# Patient Record
Sex: FEMALE | Race: WHITE | Employment: OTHER | URBAN - METROPOLITAN AREA
[De-identification: names, ages, dates, MRNs, and addresses within clinical notes are randomized per-mention and may not be internally consistent; named-entity substitution may affect disease eponyms.]

---

## 2022-02-24 ENCOUNTER — APPOINTMENT (OUTPATIENT)
Dept: GENERAL RADIOLOGY | Age: 87
End: 2022-02-24
Attending: SPECIALIST
Payer: MEDICARE

## 2022-02-24 ENCOUNTER — HOSPITAL ENCOUNTER (OUTPATIENT)
Dept: CARDIAC CATH/INVASIVE PROCEDURES | Age: 87
Setting detail: OBSERVATION
Discharge: HOME | End: 2022-02-25
Attending: SPECIALIST | Admitting: SPECIALIST
Payer: MEDICARE

## 2022-02-24 PROBLEM — I44.1 SECOND DEGREE AV BLOCK: Status: ACTIVE | Noted: 2022-02-24

## 2022-02-24 PROCEDURE — 6370000000 HC RX 637 (ALT 250 FOR IP): Performed by: SPECIALIST

## 2022-02-24 PROCEDURE — 2580000003 HC RX 258: Performed by: SPECIALIST

## 2022-02-24 PROCEDURE — 2500000003 HC RX 250 WO HCPCS

## 2022-02-24 PROCEDURE — C1894 INTRO/SHEATH, NON-LASER: HCPCS

## 2022-02-24 PROCEDURE — 33208 INSRT HEART PM ATRIAL & VENT: CPT

## 2022-02-24 PROCEDURE — G0378 HOSPITAL OBSERVATION PER HR: HCPCS

## 2022-02-24 PROCEDURE — 2580000003 HC RX 258

## 2022-02-24 PROCEDURE — C1898 LEAD, PMKR, OTHER THAN TRANS: HCPCS

## 2022-02-24 PROCEDURE — 6360000002 HC RX W HCPCS

## 2022-02-24 PROCEDURE — 2780000010 HC IMPLANT OTHER

## 2022-02-24 PROCEDURE — 71045 X-RAY EXAM CHEST 1 VIEW: CPT

## 2022-02-24 PROCEDURE — 6360000004 HC RX CONTRAST MEDICATION: Performed by: SPECIALIST

## 2022-02-24 PROCEDURE — 6360000002 HC RX W HCPCS: Performed by: SPECIALIST

## 2022-02-24 PROCEDURE — 2709999900 HC NON-CHARGEABLE SUPPLY

## 2022-02-24 PROCEDURE — C1785 PMKR, DUAL, RATE-RESP: HCPCS

## 2022-02-24 RX ORDER — HYDROCODONE BITARTRATE AND ACETAMINOPHEN 5; 325 MG/1; MG/1
1 TABLET ORAL EVERY 4 HOURS PRN
Status: DISCONTINUED | OUTPATIENT
Start: 2022-02-24 | End: 2022-02-25 | Stop reason: HOSPADM

## 2022-02-24 RX ORDER — CITALOPRAM 10 MG/1
10 TABLET ORAL DAILY
Status: DISCONTINUED | OUTPATIENT
Start: 2022-02-24 | End: 2022-02-25 | Stop reason: HOSPADM

## 2022-02-24 RX ORDER — CITALOPRAM 10 MG/1
10 TABLET ORAL DAILY
COMMUNITY

## 2022-02-24 RX ORDER — ATORVASTATIN CALCIUM 10 MG/1
10 TABLET, FILM COATED ORAL DAILY
Status: DISCONTINUED | OUTPATIENT
Start: 2022-02-24 | End: 2022-02-25 | Stop reason: HOSPADM

## 2022-02-24 RX ORDER — SODIUM CHLORIDE 450 MG/100ML
INJECTION, SOLUTION INTRAVENOUS CONTINUOUS
Status: DISCONTINUED | OUTPATIENT
Start: 2022-02-24 | End: 2022-02-24

## 2022-02-24 RX ORDER — ASPIRIN 81 MG/1
81 TABLET, CHEWABLE ORAL DAILY
Status: DISCONTINUED | OUTPATIENT
Start: 2022-02-24 | End: 2022-02-25 | Stop reason: HOSPADM

## 2022-02-24 RX ORDER — HYDROCODONE BITARTRATE AND ACETAMINOPHEN 5; 325 MG/1; MG/1
2 TABLET ORAL EVERY 4 HOURS PRN
Status: DISCONTINUED | OUTPATIENT
Start: 2022-02-24 | End: 2022-02-25 | Stop reason: HOSPADM

## 2022-02-24 RX ORDER — ATORVASTATIN CALCIUM 10 MG/1
10 TABLET, FILM COATED ORAL DAILY
COMMUNITY

## 2022-02-24 RX ORDER — ASPIRIN 81 MG/1
81 TABLET, CHEWABLE ORAL DAILY
COMMUNITY

## 2022-02-24 RX ADMIN — GENTAMICIN SULFATE: 40 INJECTION, SOLUTION INTRAMUSCULAR; INTRAVENOUS at 09:38

## 2022-02-24 RX ADMIN — IOPAMIDOL 30 ML: 612 INJECTION, SOLUTION INTRAVENOUS at 15:08

## 2022-02-24 RX ADMIN — VANCOMYCIN HYDROCHLORIDE 1000 MG: 1 INJECTION, POWDER, LYOPHILIZED, FOR SOLUTION INTRAVENOUS at 09:08

## 2022-02-24 RX ADMIN — HYDROCODONE BITARTRATE AND ACETAMINOPHEN 1 TABLET: 5; 325 TABLET ORAL at 21:05

## 2022-02-24 ASSESSMENT — PAIN SCALES - GENERAL
PAINLEVEL_OUTOF10: 0
PAINLEVEL_OUTOF10: 6

## 2022-02-24 NOTE — PROGRESS NOTES
Pt arrived to pre/post cath from cath lab. Aqualcel dressing to left chest intact no bleeding or hematoma noted. Sling to left arm. Family at bedside.

## 2022-02-24 NOTE — OP NOTE
[address]                      [address], [address] [facility]                                OPERATIVE REPORT    PATIENT NAME: Linda Henriquez                    :        1935  MED REC NO:   [id number]                            ROOM:  ACCOUNT NO:   [de-identified]                           ADMIT DATE: 2022  PROVIDER:     Shannan Kumar MD    DATE OF PROCEDURE:  2022    PREOPERATIVE DIAGNOSIS:  Symptomatic second-degree AV block with  evidence also of sick sinus syndrome. POSTOPERATIVE DIAGNOSIS:  Documented high-grade AV block during the  implant of the pacemaker. PROCEDURE PERFORMED:  Permanent pacemaker implant. SURGEON:  Shannan Kumar MD    DESCRIPTION OF PROCEDURE:  The patient was brought to the EP lab in the  postabsorptive state. Vitals were stable. Informed consent was  obtained. The left hemithorax was prepared and draped in the usual  manner. The left infraclavicular fossa was infiltrated with 2%  Xylocaine. Incision was made two fingerbreadths below the left clavicle  and deepened down to the pectoralis fascia. Pocket was made by  dissection. Hemostasis was secured. Using the Seldinger technique  initially in a caudal view of 30 degrees attempting to get hold of the  left cephalic axillary venous system failed. Venography was done. We  ended up targeting the left subclavian vein, that was done successfully  two separate times. Two separate guidewires were advanced under direct  fluoroscopy to the junction of the superior vena cava and right atrium. Two number 7-Bulgarian dilators and inducers were advanced over one of the  guidewires at a time. The dilators and the guidewires were removed. A  ventricular lead by [employer], model number [id number], serial number  [id number] was advanced under direct fluoroscopy to RV septal position. This is the best possible position. I intentionally to avoided RV  apical positions. R-wave of 4.1 mV, slew rate of 1.8 volts per second,  impedance of 665 ohms. At 0.4 milliseconds, voltage threshold was 0.7  volts and the current of 1.4 mA. 10-volt testing was done. No  diaphragmatic pacing was documented. The lead was secured to the  pectoralis fascia muscle using two separate 0-silk sutures. Similar technique was used to manipulate the right atrial lead by  [employer], model number [id number], serial number of [id number]. The lead  was advanced under direct fluoroscopy to the right atrial appendage. P-wave of 2.3 mV, slew rate of 0.6 volts per second, impedance 571 ohms. At 0.4 milliseconds, voltage threshold was 0.6 volts and the current of  1.3 mA. 10-volt testing was done. No diaphragmatic pacing was  documented. Pacing at 100 beats per minute in the atrium documented to have 2:1 AV  block and sometimes 3:1 and 4:1 AV rj relation. The pocket was  irrigated with copious amount of antibiotic. Hemostasis was secured. A  pacemaker by Medtronic, model number [id number] serial number [id number] was  hooked up to the leads. Proper sensing and pacing was documented. A  TYRX pouch by Medtronic was used. The subcutaneous tissue was closed  using 3-0 Vicryl in two separate layers. The subcuticular tissue was  closed using 4-0 Vicryl. The wound was bandaged in the usual fashion. The patient tolerated the procedure well and was discharged from the EP  lab in stable condition.         Zoltan Segura MD    D: 02/24/2022 11:57:14       T: 02/24/2022 12:00:44     [email or web address]  Job#: [id number]     Doc#: [id number]    CC:

## 2022-02-24 NOTE — PROGRESS NOTES
Monitor attached and transport notified. Patient transferred to one [facility] by cart.   Left subclavian no bleeding or hematoma

## 2022-02-25 ENCOUNTER — APPOINTMENT (OUTPATIENT)
Dept: GENERAL RADIOLOGY | Age: 87
End: 2022-02-25
Attending: SPECIALIST
Payer: MEDICARE

## 2022-02-25 VITALS
HEART RATE: 51 BPM | RESPIRATION RATE: 18 BRPM | DIASTOLIC BLOOD PRESSURE: 57 MMHG | WEIGHT: 134 LBS | BODY MASS INDEX: 22.88 KG/M2 | HEIGHT: 64 IN | SYSTOLIC BLOOD PRESSURE: 109 MMHG | TEMPERATURE: 97.4 F | OXYGEN SATURATION: 95 %

## 2022-02-25 PROCEDURE — G0378 HOSPITAL OBSERVATION PER HR: HCPCS

## 2022-02-25 PROCEDURE — 6360000002 HC RX W HCPCS: Performed by: SPECIALIST

## 2022-02-25 PROCEDURE — 6370000000 HC RX 637 (ALT 250 FOR IP): Performed by: SPECIALIST

## 2022-02-25 PROCEDURE — 2580000003 HC RX 258: Performed by: SPECIALIST

## 2022-02-25 PROCEDURE — 71046 X-RAY EXAM CHEST 2 VIEWS: CPT

## 2022-02-25 RX ADMIN — VANCOMYCIN HYDROCHLORIDE 1000 MG: 1 INJECTION, POWDER, LYOPHILIZED, FOR SOLUTION INTRAVENOUS at 10:30

## 2022-02-25 RX ADMIN — ATORVASTATIN CALCIUM 10 MG: 10 TABLET, FILM COATED ORAL at 07:44

## 2022-02-25 RX ADMIN — ASPIRIN 81 MG 81 MG: 81 TABLET ORAL at 07:44

## 2022-02-25 RX ADMIN — CITALOPRAM HYDROBROMIDE 10 MG: 10 TABLET ORAL at 07:44

## 2022-02-25 ASSESSMENT — PAIN SCALES - GENERAL
PAINLEVEL_OUTOF10: 0

## 2022-02-25 NOTE — CARE COORDINATION
[facility] Case Management Initial Discharge Assessment    Met with Patient to discuss discharge plan. PCP: No primary care provider on file. DR. Chandrakant Álvarez                 Date of Last Visit: LAST MONTH     [facility] Patient: No        [facility] Notified: no    If no PCP, list provided? N/A    Discharge Planning    Living Arrangements: independently at home    Who do you live with? ALONE   Who helps you with your care:  [facility]     If lives at home:     Do you have any barriers navigating in your home? yes - 1 STEP    Patient can perform ADL? Yes    Current Services (outpatient and in home) :  None    Dialysis: No    Is transportation available to get to your appointments? Yes    DME Equipment:  no    Respiratory equipment: None    Respiratory provider:  no     Pharmacy:  [facility]     Consult with Medication Assistance Program?  No      Patient agreeable to [facility]? Declined    Patient agreeable to SNF/Rehab? Declined    Other discharge needs identified? N/A    Does Patient Have a High-Risk for Readmission Diagnosis (CHF, PN, MI, COPD)? No      Initial Discharge Plan? DISCUSSED W PT NEEDS AT DISCHARGE. PT DENIES NEEDS AT THIS TIME. CM TO FOLLOW.  (Note: please see concurrent daily documentation for any updates after initial note).       Readmission Risk              Risk of Unplanned Readmission:  0         Electronically signed by Ki Li RN on 2/25/2022 at 1:42 PM

## 2022-02-25 NOTE — DISCHARGE INSTR - ACTIVITY
NO HEAVY LIFTING OR DO NOT RAISE LEFT ARM ABOVE HEART LEVEL FOR 4-6 WEEKS. WEAR ARM SLING AS NEEDED FOR REMINDER. WATCH LEFT DRESSING FOR INCREASED SWELLING- HARDNESS- EXCESSIVE BRUISING WITH SWELLING. IF SO CALL DR. JAIN.

## 2022-02-25 NOTE — DISCHARGE INSTR - DIET
Good nutrition is important when healing from an illness, injury, or surgery. Follow any nutrition recommendations given to you during your hospital stay. If you were given an oral nutrition supplement while in the hospital, continue to take this supplement at home. You can take it with meals, in-between meals, and/or before bedtime. These supplements can be purchased at most local grocery stores, pharmacies, and chain Outline-stores. If you have any questions about your diet or nutrition, call the hospital and ask for the dietitian. LOW SALT LOW FAT DIET.

## 2023-09-20 ENCOUNTER — HOSPITAL ENCOUNTER (OUTPATIENT)
Dept: DATA CONVERSION | Facility: HOSPITAL | Age: 88
End: 2023-09-20
Attending: INTERNAL MEDICINE | Admitting: INTERNAL MEDICINE

## 2023-09-20 DIAGNOSIS — Z86.73 PERSONAL HISTORY OF TRANSIENT ISCHEMIC ATTACK (TIA), AND CEREBRAL INFARCTION WITHOUT RESIDUAL DEFICITS: ICD-10-CM

## 2023-09-20 DIAGNOSIS — Z95.0 PRESENCE OF CARDIAC PACEMAKER: ICD-10-CM

## 2023-09-20 DIAGNOSIS — Z87.891 PERSONAL HISTORY OF NICOTINE DEPENDENCE: ICD-10-CM

## 2023-09-20 DIAGNOSIS — Z45.09 ENCOUNTER FOR ADJUSTMENT AND MANAGEMENT OF OTHER CARDIAC DEVICE: ICD-10-CM

## 2023-09-20 DIAGNOSIS — I48.0 PAROXYSMAL ATRIAL FIBRILLATION (MULTI): ICD-10-CM

## 2023-09-20 DIAGNOSIS — Z79.01 LONG TERM (CURRENT) USE OF ANTICOAGULANTS: ICD-10-CM

## 2023-11-09 DIAGNOSIS — Z95.0 PACEMAKER: Primary | ICD-10-CM

## 2023-11-09 DIAGNOSIS — I48.0 PAROXYSMAL ATRIAL FIBRILLATION (MULTI): ICD-10-CM

## 2024-01-09 ENCOUNTER — TELEPHONE (OUTPATIENT)
Dept: CARDIOLOGY | Facility: CLINIC | Age: 89
End: 2024-01-09
Payer: MEDICARE

## 2024-01-09 NOTE — TELEPHONE ENCOUNTER
Patient says the doctor who used to refill her Sotalol 80mg is no longer her provider but she needs a refill before she sees you on 2/13/24. Would it be possible to refill that RX to the [facility] in [place]?

## 2024-01-22 NOTE — TELEPHONE ENCOUNTER
Patient stopped in the office about IC Sotalol 80 mg again. She only has a few day's worth of medication left. Pharmacy [facility] in [place] [phone]    She was an [facility] patient, and she says Dr. Henriquez took her loop recorder out. But she still have pace maker. She has an appointment to see Dr. Henriquez on 2/13/24

## 2024-01-23 DIAGNOSIS — I48.0 PAROXYSMAL ATRIAL FIBRILLATION (MULTI): Primary | ICD-10-CM

## 2024-01-23 RX ORDER — SOTALOL HYDROCHLORIDE 80 MG/1
40 TABLET ORAL DAILY
Qty: 45 TABLET | Refills: 3 | Status: SHIPPED | OUTPATIENT
Start: 2024-01-23 | End: 2024-01-25 | Stop reason: SDUPTHER

## 2024-01-23 NOTE — Clinical Note
PT needs sotalol re-sent to [facility]: pharmacy updated in chart now: (was previously sent to [facility])  please sign

## 2024-01-24 NOTE — TELEPHONE ENCOUNTER
IC Sotalol 80mg sent to wrong pharmacy; please re-send to:      [id number]  [address] [place] [address]  Phone: [phone]   Fax: [phone]

## 2024-01-25 DIAGNOSIS — I48.0 PAROXYSMAL ATRIAL FIBRILLATION (MULTI): ICD-10-CM

## 2024-01-25 RX ORDER — SOTALOL HYDROCHLORIDE 80 MG/1
40 TABLET ORAL DAILY
Qty: 45 TABLET | Refills: 3 | Status: CANCELLED | OUTPATIENT
Start: 2024-01-25 | End: 2025-01-24

## 2024-01-25 RX ORDER — SOTALOL HYDROCHLORIDE 80 MG/1
40 TABLET ORAL DAILY
Qty: 45 TABLET | Refills: 3 | Status: SHIPPED | OUTPATIENT
Start: 2024-01-25 | End: 2025-01-24

## 2024-01-25 NOTE — PROGRESS NOTES
IC Sotalol 80mg sent to wrong pharmacy; please re-send to:       [id number]  [address] [place] [address]  Phone: [phone]   Fax: [phone]      sent to provider for re-send with updated pharmacy

## 2024-02-13 ENCOUNTER — ANCILLARY PROCEDURE (OUTPATIENT)
Dept: CARDIOLOGY | Facility: CLINIC | Age: 89
End: 2024-02-13
Payer: MEDICARE

## 2024-02-13 ENCOUNTER — OFFICE VISIT (OUTPATIENT)
Dept: CARDIOLOGY | Facility: CLINIC | Age: 89
End: 2024-02-13
Payer: MEDICARE

## 2024-02-13 VITALS
HEIGHT: 63 IN | HEART RATE: 89 BPM | TEMPERATURE: 96.8 F | BODY MASS INDEX: 23.85 KG/M2 | SYSTOLIC BLOOD PRESSURE: 100 MMHG | DIASTOLIC BLOOD PRESSURE: 80 MMHG | WEIGHT: 134.6 LBS

## 2024-02-13 DIAGNOSIS — I48.0 PAROXYSMAL ATRIAL FIBRILLATION (MULTI): ICD-10-CM

## 2024-02-13 DIAGNOSIS — Z95.0 PACEMAKER: ICD-10-CM

## 2024-02-13 DIAGNOSIS — I48.0 PAROXYSMAL ATRIAL FIBRILLATION (MULTI): Primary | ICD-10-CM

## 2024-02-13 DIAGNOSIS — Z95.0 CARDIAC PACEMAKER IN SITU: ICD-10-CM

## 2024-02-13 PROCEDURE — 99213 OFFICE O/P EST LOW 20 MIN: CPT | Performed by: INTERNAL MEDICINE

## 2024-02-13 PROCEDURE — 93280 PM DEVICE PROGR EVAL DUAL: CPT | Performed by: INTERNAL MEDICINE

## 2024-02-13 PROCEDURE — 93290 INTERROG DEV EVAL ICPMS IP: CPT | Performed by: INTERNAL MEDICINE

## 2024-02-13 PROCEDURE — 1036F TOBACCO NON-USER: CPT | Performed by: INTERNAL MEDICINE

## 2024-02-13 PROCEDURE — 1159F MED LIST DOCD IN RCRD: CPT | Performed by: INTERNAL MEDICINE

## 2024-02-13 RX ORDER — ATORVASTATIN CALCIUM 10 MG/1
10 TABLET, FILM COATED ORAL DAILY
COMMUNITY

## 2024-02-13 RX ORDER — CHOLECALCIFEROL (VITAMIN D3) 50 MCG
2000 TABLET ORAL DAILY
COMMUNITY

## 2024-02-13 RX ORDER — RIVAROXABAN 15 MG/1
15 TABLET, FILM COATED ORAL DAILY
Qty: 90 TABLET | Refills: 3 | Status: SHIPPED | OUTPATIENT
Start: 2024-02-13

## 2024-02-13 RX ORDER — RIVAROXABAN 15 MG/1
15 TABLET, FILM COATED ORAL DAILY
COMMUNITY
End: 2024-02-13 | Stop reason: SDUPTHER

## 2024-02-13 RX ORDER — NAPROXEN SODIUM 220 MG/1
81 TABLET, FILM COATED ORAL DAILY
COMMUNITY
End: 2024-02-13 | Stop reason: WASHOUT

## 2024-02-13 RX ORDER — CITALOPRAM 10 MG/1
10 TABLET ORAL DAILY
COMMUNITY

## 2024-02-13 ASSESSMENT — PATIENT HEALTH QUESTIONNAIRE - PHQ9
SUM OF ALL RESPONSES TO PHQ9 QUESTIONS 1 AND 2: 0
1. LITTLE INTEREST OR PLEASURE IN DOING THINGS: NOT AT ALL
2. FEELING DOWN, DEPRESSED OR HOPELESS: NOT AT ALL

## 2024-02-13 NOTE — PROGRESS NOTES
Subjective:  The patient is an 88-year-old white female, followed primarily by Dr. Minnie Arrington, who presents for pacemaker follow-up.  She has lived in [place], [place] (near [place]) for over 50 years, but moved to [place] a few years ago after her  , as her daughter lives in the same condominium complex that she does.  While in [place], the patient underwent a pulmonary vein isolation because of paroxysmal atrial fibrillation, done in .  The patient suffered a small stroke in 2021, with expressive aphasia that resolved.  She underwent insertable loop recorder placement by Dr. Venkatesh Jorge, looking for paroxysmal atrial fibrillation, although this ultimately showed intermittent high-grade AV block.  In 2022, a Medtronic DDDR pacemaker was placed but the loop recorder was not removed.  The atrial fibrillation burden increased by both devices, and in 2022, the patient was hospitalized for initiation of sotalol, handling a dose of just 40 mg daily.  She has been anticoagulated with Xarelto 15 mg daily.  I elected to remove her loop recorder, done in 2023.    The patient is currently doing well.  She attends [employer] and tries to read through the entire Bible each year.  She is currently in the Old Testament book of Numbers.    The patient's daughter is an  and works long hours.  Her daughter is not .  The patient does not dine with her daughter much, but cooks TV dinners instead.    Current Outpatient Medications   Medication Sig    atorvastatin (Lipitor) 10 mg tablet Take 1 tablet (10 mg) by mouth once daily.    cholecalciferol (Vitamin D-3) 50 MCG (2000 UT) tablet Take 1 tablet (2,000 Units) by mouth once daily.    citalopram (CeleXA) 10 mg tablet Take 1 tablet (10 mg) by mouth once daily.    sotalol (Betapace) 80 mg tablet Take 0.5 tablets (40 mg) by mouth once daily.    Xarelto 15 mg tablet Take 1 tablet (15 mg) by  "mouth once daily.     Allergies:  Patient has no known allergies.     Past Surgical History:   Procedure Laterality Date    MR HEAD ANGIO WO IV CONTRAST  3/30/2021    MR HEAD ANGIO WO IV CONTRAST 3/30/2021 [facility] CLINICAL [facility]GAC[facility]    MR NECK ANGIO WO IV CONTRAST  3/30/2021    MR NECK ANGIO WO IV CONTRAST 3/30/2021 [facility] CLINICAL LEGACY     Objective:  Vitals:    02/13/24 1524   BP: 100/80   Pulse: 89   Temp: 36 °C (96.8 °F)   Height:     1.6 m (5' 3\")  Weight: 61.1 kg (134 lb 9.6 oz)     Exam:  Gen: Delightful elderly woman in no distress.  HEENT: No scleral icterus.  Neck: No jugular venous distention or thyromegaly.  Left subclavian pacemaker pocket: Normal in appearance.  Lungs: Clear to auscultation, with no wheezes or rales.  Heart: Regular rhythm with grade 1/6 systolic ejection murmur and preserved S2.  Abdomen: Benign, with no organomegaly or masses.  Extremities: Intact distal pulses; no edema.  Neuro: No focal neurologic abnormalities.  Skin: No cutaneous lesions.    Device Check:  A Medtronic pacemaker check was done and demonstrated normal device function.  The unit is programmed DDDR between 60 bpm and 120 bpm, which provides 58% atrial pacing and 99.8% ventricular pacing due to AV block.  The lead parameters were good.  The atrial fibrillation burden was less than 0.1%.  The remaining device longevity is 9.3 years.    Impressions:  1.  Paroxysmal atrial fibrillation, status post PVI in 2016 but with later recurrences.  She is now on sotalol at 40 mg daily and appears to be maintaining sinus rhythm well.  She has a [id number] score of at least 5 (female gender, 2 points for age over 75, 2 points for stroke), and is appropriately anticoagulated with Xarelto.  2.  Intermittent AV block, status post Medtronic DDDR pacemaker placement in February 2022, with normal device function.  3.  Status post Medtronic insertable loop recorder placement in April 2021, removed by me in September 2023.  4.  Status post " small stroke in March 2021, with no long-term neurologic deficits.  5.  Anxiety and depression, on Celexa therapy.  6.  Hyperlipidemia, on statin therapy.    Recommendations:  1.  The patient's device will be followed remotely every 3 months.  2.  Will see me in the office on an annual basis for pacemaker checks.  3.  With any significant increase in her atrial fibrillation burden, she may be considered for low-dose amiodarone to replace her sotalol.      Glen Henriquez MD

## 2024-05-21 ENCOUNTER — HOSPITAL ENCOUNTER (OUTPATIENT)
Dept: CARDIOLOGY | Facility: HOSPITAL | Age: 89
Discharge: HOME | End: 2024-05-21
Payer: MEDICARE

## 2024-05-21 DIAGNOSIS — Z95.0 PACEMAKER: ICD-10-CM

## 2024-05-21 DIAGNOSIS — I48.0 PAROXYSMAL ATRIAL FIBRILLATION (MULTI): ICD-10-CM

## 2024-05-21 PROCEDURE — 93296 REM INTERROG EVL PM/IDS: CPT

## 2024-05-21 PROCEDURE — 93294 REM INTERROG EVL PM/LDLS PM: CPT | Performed by: INTERNAL MEDICINE

## 2024-08-27 ENCOUNTER — HOSPITAL ENCOUNTER (OUTPATIENT)
Dept: CARDIOLOGY | Facility: HOSPITAL | Age: 89
Discharge: HOME | End: 2024-08-27
Payer: MEDICARE

## 2024-08-27 DIAGNOSIS — Z95.0 PACEMAKER: ICD-10-CM

## 2024-08-27 DIAGNOSIS — I48.0 PAROXYSMAL ATRIAL FIBRILLATION (MULTI): ICD-10-CM

## 2024-08-27 PROCEDURE — 93294 REM INTERROG EVL PM/LDLS PM: CPT | Performed by: INTERNAL MEDICINE

## 2024-08-27 PROCEDURE — 93296 REM INTERROG EVL PM/IDS: CPT

## 2024-09-03 ENCOUNTER — TELEPHONE (OUTPATIENT)
Dept: PRIMARY CARE | Facility: CLINIC | Age: 89
End: 2024-09-03

## 2024-09-03 NOTE — TELEPHONE ENCOUNTER
Patient left voicemail stating that she is to have oral surgery.  Would like to know when to stop and start blood thinner for procedure.  Please advise.

## 2024-10-05 ENCOUNTER — APPOINTMENT (OUTPATIENT)
Dept: RADIOLOGY | Facility: HOSPITAL | Age: 89
DRG: 871 | End: 2024-10-05
Payer: MEDICARE

## 2024-10-05 ENCOUNTER — HOSPITAL ENCOUNTER (INPATIENT)
Facility: HOSPITAL | Age: 89
LOS: 3 days | Discharge: HOME | DRG: 871 | End: 2024-10-08
Attending: EMERGENCY MEDICINE | Admitting: INTERNAL MEDICINE
Payer: MEDICARE

## 2024-10-05 ENCOUNTER — APPOINTMENT (OUTPATIENT)
Dept: CARDIOLOGY | Facility: HOSPITAL | Age: 89
DRG: 871 | End: 2024-10-05
Payer: MEDICARE

## 2024-10-05 DIAGNOSIS — K52.9 COLITIS: ICD-10-CM

## 2024-10-05 DIAGNOSIS — I95.9 HYPOTENSION, UNSPECIFIED HYPOTENSION TYPE: ICD-10-CM

## 2024-10-05 DIAGNOSIS — F33.40 RECURRENT MAJOR DEPRESSION IN REMISSION (CMS-HCC): ICD-10-CM

## 2024-10-05 DIAGNOSIS — J18.9 PNEUMONIA DUE TO INFECTIOUS ORGANISM, UNSPECIFIED LATERALITY, UNSPECIFIED PART OF LUNG: Primary | ICD-10-CM

## 2024-10-05 DIAGNOSIS — R11.2 NAUSEA AND VOMITING, UNSPECIFIED VOMITING TYPE: ICD-10-CM

## 2024-10-05 DIAGNOSIS — J18.9 PNEUMONIA OF LEFT LOWER LOBE DUE TO INFECTIOUS ORGANISM: ICD-10-CM

## 2024-10-05 PROBLEM — A41.9 SEPSIS (MULTI): Status: ACTIVE | Noted: 2024-10-05

## 2024-10-05 LAB
ALBUMIN SERPL BCP-MCNC: 4.1 G/DL (ref 3.4–5)
ALP SERPL-CCNC: 69 U/L (ref 33–136)
ALT SERPL W P-5'-P-CCNC: 31 U/L (ref 7–45)
ANION GAP SERPL CALC-SCNC: 16 MMOL/L (ref 10–20)
APPEARANCE UR: CLEAR
APTT PPP: 30 SECONDS (ref 27–38)
AST SERPL W P-5'-P-CCNC: 36 U/L (ref 9–39)
BASOPHILS # BLD AUTO: 0.01 X10*3/UL (ref 0–0.1)
BASOPHILS NFR BLD AUTO: 0.2 %
BILIRUB SERPL-MCNC: 0.8 MG/DL (ref 0–1.2)
BILIRUB UR STRIP.AUTO-MCNC: NEGATIVE MG/DL
BUN SERPL-MCNC: 18 MG/DL (ref 6–23)
CALCIUM SERPL-MCNC: 9.4 MG/DL (ref 8.6–10.3)
CARDIAC TROPONIN I PNL SERPL HS: 10 NG/L (ref 0–13)
CARDIAC TROPONIN I PNL SERPL HS: 17 NG/L (ref 0–13)
CHLORIDE SERPL-SCNC: 100 MMOL/L (ref 98–107)
CO2 SERPL-SCNC: 28 MMOL/L (ref 21–32)
COLOR UR: NORMAL
CREAT SERPL-MCNC: 0.98 MG/DL (ref 0.5–1.05)
EGFRCR SERPLBLD CKD-EPI 2021: 56 ML/MIN/1.73M*2
EOSINOPHIL # BLD AUTO: 0.07 X10*3/UL (ref 0–0.4)
EOSINOPHIL NFR BLD AUTO: 1.7 %
ERYTHROCYTE [DISTWIDTH] IN BLOOD BY AUTOMATED COUNT: 12.8 % (ref 11.5–14.5)
FLUAV RNA RESP QL NAA+PROBE: NOT DETECTED
FLUBV RNA RESP QL NAA+PROBE: NOT DETECTED
GLUCOSE SERPL-MCNC: 104 MG/DL (ref 74–99)
GLUCOSE UR STRIP.AUTO-MCNC: NORMAL MG/DL
HCT VFR BLD AUTO: 34.8 % (ref 36–46)
HGB BLD-MCNC: 11.3 G/DL (ref 12–16)
IMM GRANULOCYTES # BLD AUTO: 0.06 X10*3/UL (ref 0–0.5)
IMM GRANULOCYTES NFR BLD AUTO: 1.5 % (ref 0–0.9)
INR PPP: 1.5 (ref 0.9–1.1)
KETONES UR STRIP.AUTO-MCNC: NEGATIVE MG/DL
LACTATE SERPL-SCNC: 1.7 MMOL/L (ref 0.4–2)
LEUKOCYTE ESTERASE UR QL STRIP.AUTO: NEGATIVE
LYMPHOCYTES # BLD AUTO: 0.43 X10*3/UL (ref 0.8–3)
LYMPHOCYTES NFR BLD AUTO: 10.4 %
MAGNESIUM SERPL-MCNC: 1.58 MG/DL (ref 1.6–2.4)
MCH RBC QN AUTO: 30.5 PG (ref 26–34)
MCHC RBC AUTO-ENTMCNC: 32.5 G/DL (ref 32–36)
MCV RBC AUTO: 94 FL (ref 80–100)
MONOCYTES # BLD AUTO: 0.09 X10*3/UL (ref 0.05–0.8)
MONOCYTES NFR BLD AUTO: 2.2 %
MRSA DNA SPEC QL NAA+PROBE: NOT DETECTED
NEUTROPHILS # BLD AUTO: 3.46 X10*3/UL (ref 1.6–5.5)
NEUTROPHILS NFR BLD AUTO: 84 %
NITRITE UR QL STRIP.AUTO: NEGATIVE
NRBC BLD-RTO: 0 /100 WBCS (ref 0–0)
PH UR STRIP.AUTO: 5.5 [PH]
PLATELET # BLD AUTO: 94 X10*3/UL (ref 150–450)
POTASSIUM SERPL-SCNC: 4.1 MMOL/L (ref 3.5–5.3)
PROT SERPL-MCNC: 7.3 G/DL (ref 6.4–8.2)
PROT UR STRIP.AUTO-MCNC: NEGATIVE MG/DL
PROTHROMBIN TIME: 16.6 SECONDS (ref 9.8–12.8)
RBC # BLD AUTO: 3.71 X10*6/UL (ref 4–5.2)
RBC # UR STRIP.AUTO: NEGATIVE /UL
RBC MORPH BLD: NORMAL
SARS-COV-2 RNA RESP QL NAA+PROBE: NOT DETECTED
SODIUM SERPL-SCNC: 140 MMOL/L (ref 136–145)
SP GR UR STRIP.AUTO: 1.01
UROBILINOGEN UR STRIP.AUTO-MCNC: NORMAL MG/DL
WBC # BLD AUTO: 4.1 X10*3/UL (ref 4.4–11.3)

## 2024-10-05 PROCEDURE — 81003 URINALYSIS AUTO W/O SCOPE: CPT

## 2024-10-05 PROCEDURE — 84484 ASSAY OF TROPONIN QUANT: CPT

## 2024-10-05 PROCEDURE — 85610 PROTHROMBIN TIME: CPT

## 2024-10-05 PROCEDURE — 71275 CT ANGIOGRAPHY CHEST: CPT | Mod: FOREIGN READ | Performed by: RADIOLOGY

## 2024-10-05 PROCEDURE — 70450 CT HEAD/BRAIN W/O DYE: CPT

## 2024-10-05 PROCEDURE — 96375 TX/PRO/DX INJ NEW DRUG ADDON: CPT

## 2024-10-05 PROCEDURE — 83605 ASSAY OF LACTIC ACID: CPT

## 2024-10-05 PROCEDURE — 2500000004 HC RX 250 GENERAL PHARMACY W/ HCPCS (ALT 636 FOR OP/ED)

## 2024-10-05 PROCEDURE — 87636 SARSCOV2 & INF A&B AMP PRB: CPT

## 2024-10-05 PROCEDURE — 71045 X-RAY EXAM CHEST 1 VIEW: CPT

## 2024-10-05 PROCEDURE — 72125 CT NECK SPINE W/O DYE: CPT

## 2024-10-05 PROCEDURE — 99291 CRITICAL CARE FIRST HOUR: CPT | Mod: 25 | Performed by: EMERGENCY MEDICINE

## 2024-10-05 PROCEDURE — 74177 CT ABD & PELVIS W/CONTRAST: CPT | Mod: FOREIGN READ | Performed by: RADIOLOGY

## 2024-10-05 PROCEDURE — 2020000001 HC ICU ROOM DAILY

## 2024-10-05 PROCEDURE — 96365 THER/PROPH/DIAG IV INF INIT: CPT

## 2024-10-05 PROCEDURE — 87040 BLOOD CULTURE FOR BACTERIA: CPT | Mod: STJLAB

## 2024-10-05 PROCEDURE — 83735 ASSAY OF MAGNESIUM: CPT

## 2024-10-05 PROCEDURE — 93005 ELECTROCARDIOGRAM TRACING: CPT

## 2024-10-05 PROCEDURE — 80053 COMPREHEN METABOLIC PANEL: CPT

## 2024-10-05 PROCEDURE — 36415 COLL VENOUS BLD VENIPUNCTURE: CPT

## 2024-10-05 PROCEDURE — 99291 CRITICAL CARE FIRST HOUR: CPT | Performed by: EMERGENCY MEDICINE

## 2024-10-05 PROCEDURE — 70450 CT HEAD/BRAIN W/O DYE: CPT | Performed by: RADIOLOGY

## 2024-10-05 PROCEDURE — 84145 PROCALCITONIN (PCT): CPT | Mod: STJLAB

## 2024-10-05 PROCEDURE — 96367 TX/PROPH/DG ADDL SEQ IV INF: CPT

## 2024-10-05 PROCEDURE — 2500000005 HC RX 250 GENERAL PHARMACY W/O HCPCS

## 2024-10-05 PROCEDURE — 2550000001 HC RX 255 CONTRASTS: Performed by: EMERGENCY MEDICINE

## 2024-10-05 PROCEDURE — 71045 X-RAY EXAM CHEST 1 VIEW: CPT | Mod: FOREIGN READ | Performed by: RADIOLOGY

## 2024-10-05 PROCEDURE — 74177 CT ABD & PELVIS W/CONTRAST: CPT

## 2024-10-05 PROCEDURE — 87641 MR-STAPH DNA AMP PROBE: CPT

## 2024-10-05 PROCEDURE — 85025 COMPLETE CBC W/AUTO DIFF WBC: CPT

## 2024-10-05 PROCEDURE — 71275 CT ANGIOGRAPHY CHEST: CPT

## 2024-10-05 PROCEDURE — 2500000001 HC RX 250 WO HCPCS SELF ADMINISTERED DRUGS (ALT 637 FOR MEDICARE OP)

## 2024-10-05 PROCEDURE — 87449 NOS EACH ORGANISM AG IA: CPT | Mod: STJLAB

## 2024-10-05 PROCEDURE — 87899 AGENT NOS ASSAY W/OPTIC: CPT | Mod: STJLAB

## 2024-10-05 PROCEDURE — 72125 CT NECK SPINE W/O DYE: CPT | Performed by: RADIOLOGY

## 2024-10-05 RX ORDER — ATORVASTATIN CALCIUM 10 MG/1
10 TABLET, FILM COATED ORAL DAILY
Status: DISCONTINUED | OUTPATIENT
Start: 2024-10-06 | End: 2024-10-06

## 2024-10-05 RX ORDER — MAGNESIUM SULFATE HEPTAHYDRATE 40 MG/ML
2 INJECTION, SOLUTION INTRAVENOUS EVERY 6 HOURS PRN
Status: DISCONTINUED | OUTPATIENT
Start: 2024-10-05 | End: 2024-10-07

## 2024-10-05 RX ORDER — ACETAMINOPHEN 325 MG/1
650 TABLET ORAL EVERY 6 HOURS PRN
Status: DISCONTINUED | OUTPATIENT
Start: 2024-10-05 | End: 2024-10-08 | Stop reason: HOSPADM

## 2024-10-05 RX ORDER — POLYETHYLENE GLYCOL 3350 17 G/17G
17 POWDER, FOR SOLUTION ORAL DAILY
Status: DISCONTINUED | OUTPATIENT
Start: 2024-10-05 | End: 2024-10-08 | Stop reason: HOSPADM

## 2024-10-05 RX ORDER — CALCIUM GLUCONATE 20 MG/ML
2 INJECTION, SOLUTION INTRAVENOUS EVERY 6 HOURS PRN
Status: DISCONTINUED | OUTPATIENT
Start: 2024-10-05 | End: 2024-10-07

## 2024-10-05 RX ORDER — MAGNESIUM SULFATE HEPTAHYDRATE 40 MG/ML
2 INJECTION, SOLUTION INTRAVENOUS ONCE
Status: COMPLETED | OUTPATIENT
Start: 2024-10-05 | End: 2024-10-05

## 2024-10-05 RX ORDER — KETOROLAC TROMETHAMINE 15 MG/ML
15 INJECTION, SOLUTION INTRAMUSCULAR; INTRAVENOUS ONCE
Status: COMPLETED | OUTPATIENT
Start: 2024-10-05 | End: 2024-10-05

## 2024-10-05 RX ORDER — CITALOPRAM 20 MG/1
10 TABLET, FILM COATED ORAL DAILY
Status: DISCONTINUED | OUTPATIENT
Start: 2024-10-06 | End: 2024-10-07 | Stop reason: ALTCHOICE

## 2024-10-05 RX ORDER — MAGNESIUM SULFATE HEPTAHYDRATE 40 MG/ML
4 INJECTION, SOLUTION INTRAVENOUS EVERY 6 HOURS PRN
Status: DISCONTINUED | OUTPATIENT
Start: 2024-10-05 | End: 2024-10-07

## 2024-10-05 RX ORDER — POTASSIUM CHLORIDE 1.5 G/1.58G
20 POWDER, FOR SOLUTION ORAL EVERY 6 HOURS PRN
Status: DISCONTINUED | OUTPATIENT
Start: 2024-10-05 | End: 2024-10-08

## 2024-10-05 RX ORDER — POTASSIUM CHLORIDE 20 MEQ/1
20 TABLET, EXTENDED RELEASE ORAL EVERY 6 HOURS PRN
Status: DISCONTINUED | OUTPATIENT
Start: 2024-10-05 | End: 2024-10-08

## 2024-10-05 RX ORDER — CALCIUM GLUCONATE 20 MG/ML
1 INJECTION, SOLUTION INTRAVENOUS EVERY 6 HOURS PRN
Status: DISCONTINUED | OUTPATIENT
Start: 2024-10-05 | End: 2024-10-07

## 2024-10-05 RX ORDER — POTASSIUM CHLORIDE 1.5 G/1.58G
40 POWDER, FOR SOLUTION ORAL EVERY 6 HOURS PRN
Status: DISCONTINUED | OUTPATIENT
Start: 2024-10-05 | End: 2024-10-08

## 2024-10-05 RX ORDER — PANTOPRAZOLE SODIUM 40 MG/1
40 TABLET, DELAYED RELEASE ORAL
Status: DISCONTINUED | OUTPATIENT
Start: 2024-10-06 | End: 2024-10-08 | Stop reason: HOSPADM

## 2024-10-05 RX ORDER — ONDANSETRON HYDROCHLORIDE 2 MG/ML
4 INJECTION, SOLUTION INTRAVENOUS ONCE
Status: COMPLETED | OUTPATIENT
Start: 2024-10-05 | End: 2024-10-05

## 2024-10-05 RX ORDER — SOTALOL HYDROCHLORIDE 80 MG/1
40 TABLET ORAL DAILY
Status: DISCONTINUED | OUTPATIENT
Start: 2024-10-06 | End: 2024-10-08 | Stop reason: HOSPADM

## 2024-10-05 RX ORDER — CHOLECALCIFEROL (VITAMIN D3) 25 MCG
2000 TABLET ORAL DAILY
Status: DISCONTINUED | OUTPATIENT
Start: 2024-10-06 | End: 2024-10-08 | Stop reason: HOSPADM

## 2024-10-05 RX ORDER — ONDANSETRON HYDROCHLORIDE 2 MG/ML
4 INJECTION, SOLUTION INTRAVENOUS EVERY 8 HOURS PRN
Status: DISCONTINUED | OUTPATIENT
Start: 2024-10-05 | End: 2024-10-08 | Stop reason: HOSPADM

## 2024-10-05 RX ORDER — ACETAMINOPHEN 325 MG/1
975 TABLET ORAL ONCE
Status: COMPLETED | OUTPATIENT
Start: 2024-10-05 | End: 2024-10-05

## 2024-10-05 RX ORDER — LANOLIN ALCOHOL/MO/W.PET/CERES
400 CREAM (GRAM) TOPICAL DAILY
Status: DISCONTINUED | OUTPATIENT
Start: 2024-10-05 | End: 2024-10-08 | Stop reason: HOSPADM

## 2024-10-05 RX ORDER — POTASSIUM CHLORIDE 20 MEQ/1
40 TABLET, EXTENDED RELEASE ORAL EVERY 6 HOURS PRN
Status: DISCONTINUED | OUTPATIENT
Start: 2024-10-05 | End: 2024-10-08

## 2024-10-05 SDOH — ECONOMIC STABILITY: INCOME INSECURITY: IN THE LAST 12 MONTHS, WAS THERE A TIME WHEN YOU WERE NOT ABLE TO PAY THE MORTGAGE OR RENT ON TIME?: NO

## 2024-10-05 SDOH — SOCIAL STABILITY: SOCIAL INSECURITY: WITHIN THE LAST YEAR, HAVE YOU BEEN AFRAID OF YOUR PARTNER OR EX-PARTNER?: NO

## 2024-10-05 SDOH — ECONOMIC STABILITY: TRANSPORTATION INSECURITY: IN THE PAST 12 MONTHS, HAS LACK OF TRANSPORTATION KEPT YOU FROM MEDICAL APPOINTMENTS OR FROM GETTING MEDICATIONS?: NO

## 2024-10-05 SDOH — ECONOMIC STABILITY: FOOD INSECURITY: WITHIN THE PAST 12 MONTHS, THE FOOD YOU BOUGHT JUST DIDN'T LAST AND YOU DIDN'T HAVE MONEY TO GET MORE.: NEVER TRUE

## 2024-10-05 SDOH — SOCIAL STABILITY: SOCIAL INSECURITY: WITHIN THE LAST YEAR, HAVE YOU BEEN HUMILIATED OR EMOTIONALLY ABUSED IN OTHER WAYS BY YOUR PARTNER OR EX-PARTNER?: NO

## 2024-10-05 SDOH — ECONOMIC STABILITY: HOUSING INSECURITY: IN THE PAST 12 MONTHS, HOW MANY TIMES HAVE YOU MOVED WHERE YOU WERE LIVING?: 0

## 2024-10-05 SDOH — SOCIAL STABILITY: SOCIAL INSECURITY
WITHIN THE LAST YEAR, HAVE TO BEEN RAPED OR FORCED TO HAVE ANY KIND OF SEXUAL ACTIVITY BY YOUR PARTNER OR EX-PARTNER?: NO

## 2024-10-05 SDOH — SOCIAL STABILITY: SOCIAL INSECURITY
WITHIN THE LAST YEAR, HAVE YOU BEEN RAPED OR FORCED TO HAVE ANY KIND OF SEXUAL ACTIVITY BY YOUR PARTNER OR EX-PARTNER?: NO

## 2024-10-05 SDOH — ECONOMIC STABILITY: FOOD INSECURITY: WITHIN THE PAST 12 MONTHS, YOU WORRIED THAT YOUR FOOD WOULD RUN OUT BEFORE YOU GOT THE MONEY TO BUY MORE.: NEVER TRUE

## 2024-10-05 SDOH — ECONOMIC STABILITY: HOUSING INSECURITY: AT ANY TIME IN THE PAST 12 MONTHS, WERE YOU HOMELESS OR LIVING IN A SHELTER (INCLUDING NOW)?: NO

## 2024-10-05 SDOH — SOCIAL STABILITY: SOCIAL INSECURITY: HAVE YOU HAD THOUGHTS OF HARMING ANYONE ELSE?: NO

## 2024-10-05 SDOH — SOCIAL STABILITY: SOCIAL INSECURITY: ARE THERE ANY APPARENT SIGNS OF INJURIES/BEHAVIORS THAT COULD BE RELATED TO ABUSE/NEGLECT?: NO

## 2024-10-05 SDOH — SOCIAL STABILITY: SOCIAL INSECURITY: HAS ANYONE EVER THREATENED TO HURT YOUR FAMILY OR YOUR PETS?: NO

## 2024-10-05 SDOH — ECONOMIC STABILITY: TRANSPORTATION INSECURITY
IN THE PAST 12 MONTHS, HAS LACK OF TRANSPORTATION KEPT YOU FROM MEETINGS, WORK, OR FROM GETTING THINGS NEEDED FOR DAILY LIVING?: NO

## 2024-10-05 SDOH — SOCIAL STABILITY: SOCIAL INSECURITY: DO YOU FEEL ANYONE HAS EXPLOITED OR TAKEN ADVANTAGE OF YOU FINANCIALLY OR OF YOUR PERSONAL PROPERTY?: NO

## 2024-10-05 SDOH — ECONOMIC STABILITY: FOOD INSECURITY: WITHIN THE PAST 12 MONTHS, YOU WORRIED THAT YOUR FOOD WOULD RUN OUT BEFORE YOU GOT MONEY TO BUY MORE.: NEVER TRUE

## 2024-10-05 SDOH — SOCIAL STABILITY: SOCIAL INSECURITY: DOES ANYONE TRY TO KEEP YOU FROM HAVING/CONTACTING OTHER FRIENDS OR DOING THINGS OUTSIDE YOUR HOME?: NO

## 2024-10-05 SDOH — SOCIAL STABILITY: SOCIAL INSECURITY
WITHIN THE LAST YEAR, HAVE YOU BEEN KICKED, HIT, SLAPPED, OR OTHERWISE PHYSICALLY HURT BY YOUR PARTNER OR EX-PARTNER?: NO

## 2024-10-05 SDOH — ECONOMIC STABILITY: INCOME INSECURITY: IN THE PAST 12 MONTHS HAS THE ELECTRIC, GAS, OIL, OR WATER COMPANY THREATENED TO SHUT OFF SERVICES IN YOUR HOME?: NO

## 2024-10-05 SDOH — ECONOMIC STABILITY: TRANSPORTATION INSECURITY
IN THE PAST 12 MONTHS, HAS THE LACK OF TRANSPORTATION KEPT YOU FROM MEDICAL APPOINTMENTS OR FROM GETTING MEDICATIONS?: NO

## 2024-10-05 SDOH — SOCIAL STABILITY: SOCIAL INSECURITY: ABUSE: ADULT

## 2024-10-05 SDOH — ECONOMIC STABILITY: INCOME INSECURITY: IN THE PAST 12 MONTHS, HAS THE ELECTRIC, GAS, OIL, OR WATER COMPANY THREATENED TO SHUT OFF SERVICE IN YOUR HOME?: NO

## 2024-10-05 SDOH — ECONOMIC STABILITY: HOUSING INSECURITY: IN THE LAST 12 MONTHS, WAS THERE A TIME WHEN YOU WERE NOT ABLE TO PAY THE MORTGAGE OR RENT ON TIME?: NO

## 2024-10-05 SDOH — ECONOMIC STABILITY: INCOME INSECURITY: HOW HARD IS IT FOR YOU TO PAY FOR THE VERY BASICS LIKE FOOD, HOUSING, MEDICAL CARE, AND HEATING?: NOT HARD AT ALL

## 2024-10-05 SDOH — SOCIAL STABILITY: SOCIAL INSECURITY: ARE YOU OR HAVE YOU BEEN THREATENED OR ABUSED PHYSICALLY, EMOTIONALLY, OR SEXUALLY BY ANYONE?: NO

## 2024-10-05 SDOH — ECONOMIC STABILITY: FOOD INSECURITY: HOW HARD IS IT FOR YOU TO PAY FOR THE VERY BASICS LIKE FOOD, HOUSING, MEDICAL CARE, AND HEATING?: NOT HARD AT ALL

## 2024-10-05 SDOH — SOCIAL STABILITY: SOCIAL INSECURITY: DO YOU FEEL UNSAFE GOING BACK TO THE PLACE WHERE YOU ARE LIVING?: NO

## 2024-10-05 SDOH — SOCIAL STABILITY: SOCIAL INSECURITY: HAVE YOU HAD ANY THOUGHTS OF HARMING ANYONE ELSE?: NO

## 2024-10-05 ASSESSMENT — PAIN SCALES - GENERAL
PAINLEVEL_OUTOF10: 0 - NO PAIN

## 2024-10-05 ASSESSMENT — COGNITIVE AND FUNCTIONAL STATUS - GENERAL
DAILY ACTIVITIY SCORE: 20
TOILETING: A LITTLE
HELP NEEDED FOR BATHING: A LITTLE
DRESSING REGULAR LOWER BODY CLOTHING: A LITTLE
CLIMB 3 TO 5 STEPS WITH RAILING: A LITTLE
DRESSING REGULAR UPPER BODY CLOTHING: A LITTLE
MOBILITY SCORE: 20
STANDING UP FROM CHAIR USING ARMS: A LITTLE
PATIENT BASELINE BEDBOUND: NO
MOVING TO AND FROM BED TO CHAIR: A LITTLE
WALKING IN HOSPITAL ROOM: A LITTLE

## 2024-10-05 ASSESSMENT — PATIENT HEALTH QUESTIONNAIRE - PHQ9
SUM OF ALL RESPONSES TO PHQ9 QUESTIONS 1 & 2: 0
1. LITTLE INTEREST OR PLEASURE IN DOING THINGS: NOT AT ALL
2. FEELING DOWN, DEPRESSED OR HOPELESS: NOT AT ALL

## 2024-10-05 ASSESSMENT — LIFESTYLE VARIABLES
HOW OFTEN DO YOU HAVE 6 OR MORE DRINKS ON ONE OCCASION: NEVER
AUDIT-C TOTAL SCORE: 1
HOW MANY STANDARD DRINKS CONTAINING ALCOHOL DO YOU HAVE ON A TYPICAL DAY: 1 OR 2
AUDIT-C TOTAL SCORE: 1
SKIP TO QUESTIONS 9-10: 1
HOW OFTEN DO YOU HAVE A DRINK CONTAINING ALCOHOL: MONTHLY OR LESS

## 2024-10-05 ASSESSMENT — ACTIVITIES OF DAILY LIVING (ADL)
DRESSING YOURSELF: INDEPENDENT
HEARING - RIGHT EAR: HEARING AID
JUDGMENT_ADEQUATE_SAFELY_COMPLETE_DAILY_ACTIVITIES: YES
PATIENT'S MEMORY ADEQUATE TO SAFELY COMPLETE DAILY ACTIVITIES?: YES
HEARING - LEFT EAR: HEARING AID
TOILETING: INDEPENDENT
FEEDING YOURSELF: INDEPENDENT
GROOMING: INDEPENDENT
LACK_OF_TRANSPORTATION: NO
ADEQUATE_TO_COMPLETE_ADL: YES
WALKS IN HOME: INDEPENDENT
ASSISTIVE_DEVICE: CANE;EYEGLASSES
LACK_OF_TRANSPORTATION: NO
BATHING: INDEPENDENT

## 2024-10-05 ASSESSMENT — COLUMBIA-SUICIDE SEVERITY RATING SCALE - C-SSRS
1. IN THE PAST MONTH, HAVE YOU WISHED YOU WERE DEAD OR WISHED YOU COULD GO TO SLEEP AND NOT WAKE UP?: NO
6. HAVE YOU EVER DONE ANYTHING, STARTED TO DO ANYTHING, OR PREPARED TO DO ANYTHING TO END YOUR LIFE?: NO
2. HAVE YOU ACTUALLY HAD ANY THOUGHTS OF KILLING YOURSELF?: NO

## 2024-10-05 ASSESSMENT — PAIN - FUNCTIONAL ASSESSMENT
PAIN_FUNCTIONAL_ASSESSMENT: 0-10
PAIN_FUNCTIONAL_ASSESSMENT: 0-10

## 2024-10-05 NOTE — ED PROVIDER NOTES
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  Hyperlipidemia as well as asthma presenting today with a chief complaint of nausea vomiting and fluid symptoms.  She states that she has been feeling sick for the past few days.  She is brought in via EMS.  They reported that she had onset of vomiting around 130, the patient confirms this.  Prior to this she was feeling nauseous and states that her symptoms were building up.  She also endorses some dizziness, states that feels the room is spinning.  This dizziness is almost resolved at this time.  Denies any obvious chest pain.  She does have a mild amount of shortness of breath.  She does not endorse abdominal discomfort as well.        Nursing Notes were reviewed.    PAST MEDICAL HISTORY   History reviewed. No pertinent past medical history.      SURGICAL HISTORY       Past Surgical History:   Procedure Laterality Date   • MR HEAD ANGIO WO IV CONTRAST  3/30/2021    MR HEAD ANGIO WO IV CONTRAST 3/30/2021 [facility]ICAL [facility]GAC[facility]   • MR NECK ANGIO WO IV CONTRAST  3/30/2021    MR NECK ANGIO WO IV CONTRAST 3/30/2021 [facility] CLINICAL LEGACY         CURRENT MEDICATIONS       Current Discharge Medication List        CONTINUE these medications which have NOT CHANGED    Details   atorvastatin (Lipitor) 10 mg tablet Take 1 tablet (10 mg) by mouth once daily.      cholecalciferol (Vitamin D-3) 50 MCG (2000 UT) tablet Take 1 tablet (2,000 Units) by mouth once daily.      citalopram (CeleXA) 10 mg tablet Take 1 tablet (10  mg) by mouth once daily.      sotalol (Betapace) 80 mg tablet Take 0.5 tablets (40 mg) by mouth once daily.  Qty: 45 tablet, Refills: 3    Associated Diagnoses: Paroxysmal atrial fibrillation (Multi)      Xarelto 15 mg tablet Take 1 tablet (15 mg) by mouth once daily.  Qty: 90 tablet, Refills: 3    Associated Diagnoses: Paroxysmal atrial fibrillation (Multi)             ALLERGIES     Patient has no known allergies.    FAMILY HISTORY     No family history on file.       SOCIAL HISTORY       Social History     Socioeconomic History   • Marital status:    Tobacco Use   • Smoking status: Never   • Smokeless tobacco: Never   Substance and Sexual Activity   • Alcohol use: Not Currently   • Drug use: Not Currently     Social Determinants of Health     Financial Resource Strain: Low Risk  (10/5/2024)    Overall Financial Resource Strain (CARDIA)    • Difficulty of Paying Living Expenses: Not hard at all   Food Insecurity: No Food Insecurity (10/5/2024)    Hunger Vital Sign    • Worried About Running Out of Food in the Last Year: Never true    • Ran Out of Food in the Last Year: Never true   Transportation Needs: No Transportation Needs (10/5/2024)    PRAPARE - Transportation    • Lack of Transportation (Medical): No    • Lack of Transportation (Non-Medical): No   Intimate Partner Violence: Not At Risk (10/5/2024)    Humiliation, Afraid, Rape, and Kick questionnaire    • Fear of Current or Ex-Partner: No    • Emotionally Abused: No    • Physically Abused: No    • Sexually Abused: No   Housing Stability: Low Risk  (10/5/2024)    Housing Stability Vital Sign    • Unable to Pay for Housing in the Last Year: No    • Number of Times Moved in the Last Year: 0    • Homeless in the Last Year: No       SCREENINGS                        PHYSICAL EXAM    (up to 7 for level 4, 8 or more for level 5)     ED Triage Vitals [10/05/24 1522]   Temperature Heart Rate Respirations BP   (!) 39.2 °C (102.6 °F) 99 (!) 24 126/60      Pulse  Ox Temp Source Heart Rate Source Patient Position   (!) 85 % Temporal -- Lying      BP Location FiO2 (%)     Left arm --       Physical Exam  Vitals and nursing note reviewed.   Constitutional:       Appearance: She is ill-appearing and toxic-appearing. She is not diaphoretic.      Comments: Multiple episodes of emesis during my evaluation.   HENT:      Head: Normocephalic and atraumatic.      Right Ear: External ear normal.      Left Ear: External ear normal.      Nose: Nose normal.   Eyes:      General:         Right eye: No discharge.         Left eye: No discharge.      Extraocular Movements: Extraocular movements intact.      Conjunctiva/sclera: Conjunctivae normal.      Pupils: Pupils are equal, round, and reactive to light.   Cardiovascular:      Rate and Rhythm: Normal rate and regular rhythm.      Pulses: Normal pulses.      Heart sounds: Normal heart sounds.   Pulmonary:      Effort: Pulmonary effort is normal. No respiratory distress.      Breath sounds: Normal breath sounds.   Abdominal:      General: There is no distension.      Palpations: Abdomen is soft. There is no mass.      Tenderness: There is abdominal tenderness. There is no guarding.   Musculoskeletal:         General: No deformity or signs of injury.   Skin:     General: Skin is warm and dry.   Neurological:      General: No focal deficit present.      Mental Status: She is oriented to person, place, and time. Mental status is at baseline.      Comments: Awake and alert.  Cranial nerves II through XII intact.  PERRLA.  EOMI.  No nystagmus.  No ataxia.  No drift.  5/5 strength bilateral upper and lower extremities.  Sensation intact to light touch.  Following commands appropriately.  Face is symmetric.  Speech is clear.  Normal hints exam.    Psychiatric:         Mood and Affect: Mood normal.         Behavior: Behavior normal.        DIAGNOSTIC RESULTS     LABS:  Labs Reviewed   CBC WITH AUTO DIFFERENTIAL - Abnormal       Result Value    WBC  4.1 (*)     nRBC 0.0      RBC 3.71 (*)     Hemoglobin 11.3 (*)     Hematocrit 34.8 (*)     MCV 94      MCH 30.5      MCHC 32.5      RDW 12.8      Platelets 94 (*)     Neutrophils % 84.0      Immature Granulocytes %, Automated 1.5 (*)     Lymphocytes % 10.4      Monocytes % 2.2      Eosinophils % 1.7      Basophils % 0.2      Neutrophils Absolute 3.46      Immature Granulocytes Absolute, Automated 0.06      Lymphocytes Absolute 0.43 (*)     Monocytes Absolute 0.09      Eosinophils Absolute 0.07      Basophils Absolute 0.01     COMPREHENSIVE METABOLIC PANEL - Abnormal    Glucose 104 (*)     Sodium 140      Potassium 4.1      Chloride 100      Bicarbonate 28      Anion Gap 16      Urea Nitrogen 18      Creatinine 0.98      eGFR 56 (*)     Calcium 9.4      Albumin 4.1      Alkaline Phosphatase 69      Total Protein 7.3      AST 36      Bilirubin, Total 0.8      ALT 31     COAGULATION SCREEN - Abnormal    Protime 16.6 (*)     INR 1.5 (*)     aPTT 30      Narrative:     The APTT is no longer used for monitoring Unfractionated Heparin Therapy. For monitoring Heparin Therapy, use the Heparin Assay.   MAGNESIUM - Abnormal    Magnesium 1.58 (*)    SERIAL TROPONIN, 1 HOUR - Abnormal    Troponin I, High Sensitivity 17 (*)     Narrative:     Less than 99th percentile of normal range cutoff-  Female and children under 18 years old <14 ng/L; Male <21 ng/L: Negative  Repeat testing should be performed if clinically indicated.     Female and children under 18 years old 14-50 ng/L; Male 21-50 ng/L:  Consistent with possible cardiac damage and possible increased clinical   risk. Serial measurements may help to assess extent of myocardial damage.     >50 ng/L: Consistent with cardiac damage, increased clinical risk and  myocardial infarction. Serial measurements may help assess extent of   myocardial damage.      NOTE: Children less than 1 year old may have higher baseline troponin   levels and results should be interpreted in  conjunction with the overall   clinical context.     NOTE: Troponin I testing is performed using a different   testing methodology at [facility] than at other   [facility] hospitals. Direct result comparisons should only   be made within the same method.   MRSA SURVEILLANCE FOR VANCOMYCIN DE-ESCALATION, PCR - Normal    MRSA PCR Not Detected      Narrative:     This assay is an FDA-approved in vitro diagnostic nucleic acid amplification test for the detection of methicillin-resistant Staphylococcus aureus (MRSA) DNA directly from nasal swabs in patients at risk for nasal colonization. MRSA NxG is intended to aid in the prevention and control of MRSA infections in healthcare settings. This assay is NOT intended to diagnose, guide, or monitor treatment for MRSA infections, or provide results of susceptibility to methicillin. A negative result does not preclude MRSA nasal colonization. Test performance has not been evaluated in patients less than two years of age.   LACTATE - Normal    Lactate 1.7      Narrative:     Venipuncture immediately after or during the administration of Metamizole may lead to falsely low results. Testing should be performed immediately prior to Metamizole dosing.   SARS-COV-2 PCR - Normal    Coronavirus 2019, PCR Not Detected      Narrative:     This assay has received FDA Emergency Use Authorization (EUA) and is only authorized for the duration of time that circumstances exist to justify the authorization of the emergency use of in vitro diagnostic tests for the detection of SARS-CoV-2 virus and/or diagnosis of COVID-19 infection under section 564(b)(1) of the Act, 21 U.S.C. 360bbb-3(b)(1). This assay is an in vitro diagnostic nucleic acid amplification test for the qualitative detection of SARS-CoV-2 from nasopharyngeal specimens and has been validated for use at [facility]. Negative results do not preclude COVID-19 infections and should not be used as the  sole basis for diagnosis, treatment, or other management decisions.     INFLUENZA A AND B PCR - Normal    Flu A Result Not Detected      Flu B Result Not Detected      Narrative:     This assay is an in vitro diagnostic multiplex nucleic acid amplification test for the detection and discrimination of Influenza A & B from nasopharyngeal specimens, and has been validated for use at [facility]. Negative results do not preclude Influenza A/B infections, and should not be used as the sole basis for diagnosis, treatment, or other management decisions. If Influenza A/B and RSV PCR results are negative, testing for Parainfluenza virus, Adenovirus and Metapneumovirus is routinely performed for [facility] pediatric oncology and intensive care inpatients, and is available on other patients by placing an add-on request.   URINALYSIS WITH REFLEX CULTURE AND MICROSCOPIC - Normal    Color, Urine Light-Yellow      Appearance, Urine Clear      Specific Gravity, Urine 1.013      pH, Urine 5.5      Protein, Urine NEGATIVE      Glucose, Urine Normal      Blood, Urine NEGATIVE      Ketones, Urine NEGATIVE      Bilirubin, Urine NEGATIVE      Urobilinogen, Urine Normal      Nitrite, Urine NEGATIVE      Leukocyte Esterase, Urine NEGATIVE     SERIAL TROPONIN-INITIAL - Normal    Troponin I, High Sensitivity 10      Narrative:     Less than 99th percentile of normal range cutoff-  Female and children under 18 years old <14 ng/L; Male <21 ng/L: Negative  Repeat testing should be performed if clinically indicated.     Female and children under 18 years old 14-50 ng/L; Male 21-50 ng/L:  Consistent with possible cardiac damage and possible increased clinical   risk. Serial measurements may help to assess extent of myocardial damage.     >50 ng/L: Consistent with cardiac damage, increased clinical risk and  myocardial infarction. Serial measurements may help assess extent of   myocardial damage.      NOTE: Children less than 1  year old may have higher baseline troponin   levels and results should be interpreted in conjunction with the overall   clinical context.     NOTE: Troponin I testing is performed using a different   testing methodology at [facility] than at other   [facility] hospitals. Direct result comparisons should only   be made within the same method.   BLOOD CULTURE   BLOOD CULTURE   STREPTOCOCCUS PNEUMONIAE ANTIGEN, URINE   LEGIONELLA ANTIGEN, URINE   RESPIRATORY CULTURE/SMEAR   TROPONIN SERIES- (INITIAL, 1 HR)    Narrative:     The following orders were created for panel order Troponin I Series, High Sensitivity (0, 1 HR).  Procedure                               Abnormality         Status                     ---------                               -----------         ------                     Troponin I, High Sensiti...[[id number]]  Normal              Final result               Troponin, High Sensitivi...[[id number]]  Abnormal            Final result                 Please view results for these tests on the individual orders.   URINALYSIS WITH REFLEX CULTURE AND MICROSCOPIC    Narrative:     The following orders were created for panel order Urinalysis with Reflex Culture and Microscopic.  Procedure                               Abnormality         Status                     ---------                               -----------         ------                     Urinalysis with Reflex C...[[id number]]  Normal              Final result               Extra Urine Gray Tube[[id number]]                            In process                   Please view results for these tests on the individual orders.   EXTRA URINE GRAY TUBE   CBC   COMPREHENSIVE METABOLIC PANEL   PHOSPHORUS   MAGNESIUM   PROCALCITONIN   MORPHOLOGY    RBC Morphology No significant RBC morphology present         All other labs were within normal range or not returned as of this dictation.    Imaging  CT angio chest for pulmonary embolism   Final Result    Chest:  No pulmonary artery emboli.   Acute bronchiolitis of right middle lobe.  Airspace opacity left lower   lobe concerning for pneumonia.   Multifocal mild atelectasis.   Abdomen:  Prominent gallbladder.  Recommend ultrasound for further   evaluation.  Mild periportal edema.   Mild wall thickening of the stomach.  Please correlate for gastritis.   Pelvis:  Wall thickening of the splenic flexure and descending colon   suggesting colitis.  Significant distal colonic and rectal stool.   Signed by Faheem Kumar DO      CT abdomen pelvis w IV contrast   Final Result   Chest:  No pulmonary artery emboli.   Acute bronchiolitis of right middle lobe.  Airspace opacity left lower   lobe concerning for pneumonia.   Multifocal mild atelectasis.   Abdomen:  Prominent gallbladder.  Recommend ultrasound for further   evaluation.  Mild periportal edema.   Mild wall thickening of the stomach.  Please correlate for gastritis.   Pelvis:  Wall thickening of the splenic flexure and descending colon   suggesting colitis.  Significant distal colonic and rectal stool.   Signed by Faheem Kumar DO      CT head wo IV contrast   Final Result   No acute intracranial hemorrhage or large territory infarction is   evident.        Signed by: Demetrius Barlow 10/5/2024 6:15 PM   Dictation workstation:   [id number]      CT cervical spine wo IV contrast   Final Result   Degenerative change of the cervical spine without osseous injury   evident.        Signed by: Demetrius Barlow 10/5/2024 6:17 PM   Dictation workstation:   [id number]      XR chest 1 view   Final Result   No acute cardiopulmonary disease.   Signed by Faheem Kumar DO           Procedures  Critical Care    Performed by: Stevie Azul DO  Authorized by: Stevie Azul DO    Critical care provider statement:     Critical care time (minutes):  31    Critical care was necessary to treat or prevent imminent or life-threatening deterioration of the following  conditions:  Sepsis    Critical care was time spent personally by me on the following activities:  Discussions with consultants       EMERGENCY DEPARTMENT COURSE/MDM:   Medical Decision Making  89-year-old female past medical history as above presenting today with a chief complaint of nausea and vomiting as well as flulike symptoms.  States that her symptoms started earlier this morning and began with feeling nauseous and progressed to 2 episodes of emesis starting around 1:30 PM.  She also had some dizziness at that time, this also resolved.  On arrival, patient is noted to be tachypneic, febrile to 39.2 Celsius and hypoxic to 85% on room air.  This did improve to normal saturation when placed on 3 L of oxygen via nasal cannula.  Concern for potential infectious process leading to sepsis at this time.  Differential includes but not limited to COVID, flu, other viral process, pneumonia, intra-abdominal infectious process.  We will proceed with usual septic work-up including CBC, CMP, blood cultures x2, lactate, urinalysis with reflex to culture if indicated, EKG. Empiric antibiotic therapy was initiated with vancomycin and Zosyn.  Zofran ordered for symptomatic management as well.  Toradol ordered for symptomatic management as well as to manage her fever.        Please see ED course for additional MDM.    ED Course as of 10/05/24 2234   Sat Oct 05, 2024   1821 CT head negative [CL]   1821 CT C-spine negative [CL]   1851 Troponin I Series, High Sensitivity (0, 1 HR)(!) [GB]   1914 CT of the chest without evidence of PE, there is an airspace opacity in the left lower lobe concerning for pneumonia.  She also has evidence of mild wall thickening of the splenic flexure and descending colon concerns concerning for colitis.  Prominent gallbladder noted as well.  Recommending ultrasound for further evaluation.  LFTs are normal. [CL]   1917 Patient blood pressure to begin to drop.  She did receive her full 30 cc/kg bolus of  IV fluids.  This is currently finishing up with a blood pressure of 80s over 40s.  If there is no significant improvement in her blood pressure, she will need to be started on IV vasopressors. [CL]   1923 Azithromycin added for atypical pneumonia coverage [CL]   2233 Patient was admitted to the ICU given potential need for IV vasopressors. [CL]      ED Course User Index  [CL] August Khan DO  [GB] Stevie Azul DO         Diagnoses as of 10/05/24 2234   Pneumonia due to infectious organism, unspecified laterality, unspecified part of lung   Colitis   Nausea and vomiting, unspecified vomiting type   Hypotension, unspecified hypotension type        CT angio chest for pulmonary embolism   Final Result   Chest:  No pulmonary artery emboli.   Acute bronchiolitis of right middle lobe.  Airspace opacity left lower   lobe concerning for pneumonia.   Multifocal mild atelectasis.   Abdomen:  Prominent gallbladder.  Recommend ultrasound for further   evaluation.  Mild periportal edema.   Mild wall thickening of the stomach.  Please correlate for gastritis.   Pelvis:  Wall thickening of the splenic flexure and descending colon   suggesting colitis.  Significant distal colonic and rectal stool.   Signed by Faheem Kumar DO      CT abdomen pelvis w IV contrast   Final Result   Chest:  No pulmonary artery emboli.   Acute bronchiolitis of right middle lobe.  Airspace opacity left lower   lobe concerning for pneumonia.   Multifocal mild atelectasis.   Abdomen:  Prominent gallbladder.  Recommend ultrasound for further   evaluation.  Mild periportal edema.   Mild wall thickening of the stomach.  Please correlate for gastritis.   Pelvis:  Wall thickening of the splenic flexure and descending colon   suggesting colitis.  Significant distal colonic and rectal stool.   Signed by Faheem Kumar DO      CT head wo IV contrast   Final Result   No acute intracranial hemorrhage or large territory infarction is   evident.         Signed by: Demetrius Barlow 10/5/2024 6:15 PM   Dictation workstation:   [id number]      CT cervical spine wo IV contrast   Final Result   Degenerative change of the cervical spine without osseous injury   evident.        Signed by: Demetrius Barlow 10/5/2024 6:17 PM   Dictation workstation:   [id number]      XR chest 1 view   Final Result   No acute cardiopulmonary disease.   Signed by Faheem Kumar DO          Patient and or family in agreement and understanding of treatment plan.  All questions answered.      I reviewed the case with the attending ED physician. The attending ED physician agrees with the plan. Patient and/or patient´s representative was counseled regarding labs, imaging, likely diagnosis, and plan. All questions were answered.    ED Medications administered this visit:    Medications   oxygen (O2) therapy (2 L/min inhalation Start 10/5/24 2122)   magnesium oxide (Mag-Ox) tablet 400 mg (400 mg oral Given 10/5/24 1748)   atorvastatin (Lipitor) tablet 10 mg (has no administration in time range)   cholecalciferol (Vitamin D-3) tablet 2,000 Units (has no administration in time range)   citalopram (CeleXA) tablet 10 mg (has no administration in time range)   sotalol (Betapace) tablet 40 mg (has no administration in time range)   rivaroxaban (Xarelto) tablet 15 mg (has no administration in time range)   oxygen (O2) therapy (has no administration in time range)   azithromycin 500 mg in dextrose 5% 250 mL IV (has no administration in time range)   piperacillin-tazobactam (Zosyn) 3.375 g in dextrose (iso) IV 50 mL (has no administration in time range)   acetaminophen (Tylenol) tablet 650 mg (has no administration in time range)   pantoprazole (ProtoNix) EC tablet 40 mg (has no administration in time range)   ondansetron (Zofran) injection 4 mg (has no administration in time range)   polyethylene glycol (Glycolax, Miralax) packet 17 g (17 g oral Not Given 10/5/24 2123)   magnesium sulfate 2 g in  sterile water for injection 50 mL (2 g intravenous New Bag 10/5/24 2128)   potassium chloride CR (Klor-Con M20) ER tablet 20 mEq (has no administration in time range)     Or   potassium chloride (Klor-Con) packet 20 mEq (has no administration in time range)   potassium chloride CR (Klor-Con M20) ER tablet 40 mEq (has no administration in time range)     Or   potassium chloride (Klor-Con) packet 40 mEq (has no administration in time range)   magnesium sulfate 2 g in sterile water for injection 50 mL (has no administration in time range)   magnesium sulfate 4 g in sterile water for injection 100 mL (has no administration in time range)   calcium gluconate 1 g in sodium chloride (iso) IV 50 mL (has no administration in time range)   calcium gluconate 2 g in sodium chloride (iso)  mL (has no administration in time range)   piperacillin-tazobactam (Zosyn) 4.5 g in dextrose (iso)  mL (0 g intravenous Stopped 10/5/24 1646)   ondansetron (Zofran) injection 4 mg (4 mg intravenous Given 10/5/24 1552)   ketorolac (Toradol) injection 15 mg (15 mg intravenous Given 10/5/24 1854)   acetaminophen (Tylenol) tablet 975 mg (975 mg oral Given 10/5/24 1610)   iohexol (OMNIPaque) 350 mg iodine/mL solution 75 mL (75 mL intravenous Given 10/5/24 1700)   sodium chloride 0.9 % bolus 1,755 mL (0 mL intravenous Stopped 10/5/24 2029)   azithromycin 500 mg in dextrose 5% 250 mL IV (0 mg intravenous Stopped 10/5/24 2054)   lactated Ringer's bolus 1,000 mL (1,000 mL intravenous Continue to Inpatient Floor 10/5/24 2058)     The patient was seen by the resident/fellow.  I have personally performed a substantive portion of the encounter.  I have seen and examined the patient; agree with the workup, evaluation, MDM, management and diagnosis.  The care plan has been discussed with the resident; I have reviewed the resident’s note and agree with the documented findings.                                    New Prescriptions from this visit:     Current Discharge Medication List          Follow-up:  No follow-up provider specified.      Final Impression:   1. Pneumonia due to infectious organism, unspecified laterality, unspecified part of lung    2. Colitis    3. Nausea and vomiting, unspecified vomiting type    4. Hypotension, unspecified hypotension type          (Please note that portions of this note were completed with a voice recognition program.  Efforts were made to edit the dictations but occasionally words are mis-transcribed.)     August Khan,   Resident  10/05/24 4167       Stevie Azul,   10/06/24 1132

## 2024-10-06 LAB
ALBUMIN SERPL BCP-MCNC: 3 G/DL (ref 3.4–5)
ALP SERPL-CCNC: 56 U/L (ref 33–136)
ALT SERPL W P-5'-P-CCNC: 37 U/L (ref 7–45)
ANION GAP SERPL CALC-SCNC: 11 MMOL/L (ref 10–20)
AST SERPL W P-5'-P-CCNC: 47 U/L (ref 9–39)
BASOPHILS # BLD AUTO: 0.03 X10*3/UL (ref 0–0.1)
BASOPHILS NFR BLD AUTO: 0.5 %
BILIRUB SERPL-MCNC: 0.6 MG/DL (ref 0–1.2)
BUN SERPL-MCNC: 16 MG/DL (ref 6–23)
CALCIUM SERPL-MCNC: 8.1 MG/DL (ref 8.6–10.3)
CHLORIDE SERPL-SCNC: 104 MMOL/L (ref 98–107)
CO2 SERPL-SCNC: 27 MMOL/L (ref 21–32)
CREAT SERPL-MCNC: 1.02 MG/DL (ref 0.5–1.05)
EGFRCR SERPLBLD CKD-EPI 2021: 53 ML/MIN/1.73M*2
EOSINOPHIL # BLD AUTO: 0.04 X10*3/UL (ref 0–0.4)
EOSINOPHIL NFR BLD AUTO: 0.6 %
ERYTHROCYTE [DISTWIDTH] IN BLOOD BY AUTOMATED COUNT: 13 % (ref 11.5–14.5)
ERYTHROCYTE [DISTWIDTH] IN BLOOD BY AUTOMATED COUNT: 13.1 % (ref 11.5–14.5)
GIANT PLATELETS BLD QL SMEAR: NORMAL
GLUCOSE SERPL-MCNC: 133 MG/DL (ref 74–99)
HAPTOGLOB SERPL NEPH-MCNC: 149 MG/DL (ref 30–200)
HCT VFR BLD AUTO: 34.2 % (ref 36–46)
HCT VFR BLD AUTO: 34.3 % (ref 36–46)
HGB BLD-MCNC: 10.8 G/DL (ref 12–16)
HGB BLD-MCNC: 10.9 G/DL (ref 12–16)
HOLD SPECIMEN: NORMAL
IMM GRANULOCYTES # BLD AUTO: 0.02 X10*3/UL (ref 0–0.5)
IMM GRANULOCYTES NFR BLD AUTO: 0.3 % (ref 0–0.9)
LEGIONELLA AG UR QL: NEGATIVE
LIPASE SERPL-CCNC: 19 U/L (ref 9–82)
LYMPHOCYTES # BLD AUTO: 0.42 X10*3/UL (ref 0.8–3)
LYMPHOCYTES NFR BLD AUTO: 6.7 %
MAGNESIUM SERPL-MCNC: 2.46 MG/DL (ref 1.6–2.4)
MCH RBC QN AUTO: 30.3 PG (ref 26–34)
MCH RBC QN AUTO: 30.9 PG (ref 26–34)
MCHC RBC AUTO-ENTMCNC: 31.6 G/DL (ref 32–36)
MCHC RBC AUTO-ENTMCNC: 31.8 G/DL (ref 32–36)
MCV RBC AUTO: 95 FL (ref 80–100)
MCV RBC AUTO: 98 FL (ref 80–100)
MONOCYTES # BLD AUTO: 0.19 X10*3/UL (ref 0.05–0.8)
MONOCYTES NFR BLD AUTO: 3 %
NEUTROPHILS # BLD AUTO: 5.6 X10*3/UL (ref 1.6–5.5)
NEUTROPHILS NFR BLD AUTO: 88.9 %
NRBC BLD-RTO: 0 /100 WBCS (ref 0–0)
NRBC BLD-RTO: 0 /100 WBCS (ref 0–0)
PHOSPHATE SERPL-MCNC: 4.2 MG/DL (ref 2.5–4.9)
PLATELET # BLD AUTO: 100 X10*3/UL (ref 150–450)
PLATELET # BLD AUTO: 96 X10*3/UL (ref 150–450)
POTASSIUM SERPL-SCNC: 4.2 MMOL/L (ref 3.5–5.3)
PROCALCITONIN SERPL-MCNC: 12.64 NG/ML
PROT SERPL-MCNC: 5.6 G/DL (ref 6.4–8.2)
RBC # BLD AUTO: 3.5 X10*6/UL (ref 4–5.2)
RBC # BLD AUTO: 3.6 X10*6/UL (ref 4–5.2)
RBC MORPH BLD: NORMAL
S PNEUM AG UR QL: NEGATIVE
SODIUM SERPL-SCNC: 138 MMOL/L (ref 136–145)
WBC # BLD AUTO: 6.3 X10*3/UL (ref 4.4–11.3)
WBC # BLD AUTO: 6.7 X10*3/UL (ref 4.4–11.3)

## 2024-10-06 PROCEDURE — 2500000004 HC RX 250 GENERAL PHARMACY W/ HCPCS (ALT 636 FOR OP/ED)

## 2024-10-06 PROCEDURE — 2500000001 HC RX 250 WO HCPCS SELF ADMINISTERED DRUGS (ALT 637 FOR MEDICARE OP): Performed by: INTERNAL MEDICINE

## 2024-10-06 PROCEDURE — 80053 COMPREHEN METABOLIC PANEL: CPT

## 2024-10-06 PROCEDURE — 2500000001 HC RX 250 WO HCPCS SELF ADMINISTERED DRUGS (ALT 637 FOR MEDICARE OP)

## 2024-10-06 PROCEDURE — 2500000002 HC RX 250 W HCPCS SELF ADMINISTERED DRUGS (ALT 637 FOR MEDICARE OP, ALT 636 FOR OP/ED)

## 2024-10-06 PROCEDURE — 36415 COLL VENOUS BLD VENIPUNCTURE: CPT

## 2024-10-06 PROCEDURE — 83735 ASSAY OF MAGNESIUM: CPT

## 2024-10-06 PROCEDURE — 83010 ASSAY OF HAPTOGLOBIN QUANT: CPT | Mod: STJLAB

## 2024-10-06 PROCEDURE — 76937 US GUIDE VASCULAR ACCESS: CPT

## 2024-10-06 PROCEDURE — 2500000005 HC RX 250 GENERAL PHARMACY W/O HCPCS

## 2024-10-06 PROCEDURE — 84100 ASSAY OF PHOSPHORUS: CPT

## 2024-10-06 PROCEDURE — 85027 COMPLETE CBC AUTOMATED: CPT

## 2024-10-06 PROCEDURE — 83690 ASSAY OF LIPASE: CPT

## 2024-10-06 PROCEDURE — 85025 COMPLETE CBC W/AUTO DIFF WBC: CPT | Performed by: INTERNAL MEDICINE

## 2024-10-06 PROCEDURE — 2020000001 HC ICU ROOM DAILY

## 2024-10-06 RX ORDER — CEFTRIAXONE 2 G/50ML
2 INJECTION, SOLUTION INTRAVENOUS EVERY 24 HOURS
Status: DISCONTINUED | OUTPATIENT
Start: 2024-10-06 | End: 2024-10-08 | Stop reason: HOSPADM

## 2024-10-06 RX ORDER — MIDODRINE HYDROCHLORIDE 10 MG/1
10 TABLET ORAL
Status: DISCONTINUED | OUTPATIENT
Start: 2024-10-06 | End: 2024-10-08 | Stop reason: HOSPADM

## 2024-10-06 RX ORDER — SODIUM CHLORIDE, SODIUM LACTATE, POTASSIUM CHLORIDE, CALCIUM CHLORIDE 600; 310; 30; 20 MG/100ML; MG/100ML; MG/100ML; MG/100ML
75 INJECTION, SOLUTION INTRAVENOUS CONTINUOUS
Status: DISCONTINUED | OUTPATIENT
Start: 2024-10-06 | End: 2024-10-06

## 2024-10-06 RX ORDER — ATORVASTATIN CALCIUM 10 MG/1
10 TABLET, FILM COATED ORAL NIGHTLY
Status: DISCONTINUED | OUTPATIENT
Start: 2024-10-06 | End: 2024-10-08 | Stop reason: HOSPADM

## 2024-10-06 ASSESSMENT — PAIN SCALES - GENERAL
PAINLEVEL_OUTOF10: 0 - NO PAIN
PAINLEVEL_OUTOF10: 1
PAINLEVEL_OUTOF10: 3
PAINLEVEL_OUTOF10: 0 - NO PAIN

## 2024-10-06 ASSESSMENT — COGNITIVE AND FUNCTIONAL STATUS - GENERAL
HELP NEEDED FOR BATHING: A LITTLE
CLIMB 3 TO 5 STEPS WITH RAILING: A LITTLE
DRESSING REGULAR LOWER BODY CLOTHING: A LITTLE
DAILY ACTIVITIY SCORE: 22
MOBILITY SCORE: 22
WALKING IN HOSPITAL ROOM: A LITTLE

## 2024-10-06 ASSESSMENT — PAIN DESCRIPTION - DESCRIPTORS
DESCRIPTORS: ACHING
DESCRIPTORS: ACHING

## 2024-10-06 ASSESSMENT — PAIN - FUNCTIONAL ASSESSMENT
PAIN_FUNCTIONAL_ASSESSMENT: 0-10

## 2024-10-06 ASSESSMENT — PAIN DESCRIPTION - LOCATION: LOCATION: HEAD

## 2024-10-06 NOTE — H&P
Critical Care Medicine History and Physical        Subjective   Patient is a 89 y.o. female admitted on 10/5/2024  3:20 PM  with chief complaint of fevers, chills, rigors, vomiting for approximately 6 hours prior to presentation.     HPI:  Patient is an 89-year-old female with PMH significant for paroxysmal atrial fibrillation (s/p PVI in 2016), CVA 03/2021, Hx multiple TIA, AVB s/p pacemaker, asthma, DLD, bilateral pseudophakia, MDD.  Patient presented to [facility] for chief complaint of approximately 6 hours of fevers, chills, rigors, vomiting.  She stated this all started suddenly after eating lunch, and that she felt fine this morning with no symptoms whatsoever.  Patient denies any sick contacts, or changes in dietary habits which she mostly eats TV dinners.  Patient stated she could not stop shaking and called her daughter after her daughter arrived they called EMS and she was subsequently brought to the emergency department here at [facility].  At time of my evaluation patient denied all symptomatology pertaining to a 12 point ROS which includes: Lightheadedness, dizziness, sore throat, cough, shortness of breath, visual disturbances, chest pain, palpitations, current nausea, vomiting, diarrhea, abdominal pain, melanotic stools, dysuria, hematuria, pain in the extremities or wounds.  However, she does endorse chronic lower back pain as well as fevers, chills at this time.  She stated that her nausea is markedly improved after receiving Zofran in the emergency department.    In the ED:  -Vitals: Temp 102.6 F, HR 99, RR 24, /60-86/49, SpO2 85% RA-97% 2 L via NC  -Labs: CMP: Glucose 104, magnesium 1.58, troponin 10-17, lactate 1.7             PT/INR 16.6/1.5, APTT 30             CBC: WBC 4.1, Hgb 11.3, hematocrit 34.8, platelets 94             UA: Unremarkable             COVID/flu/MRSA PCR: All negative  -Imaging: CXR: No acute cardiopulmonary disease                  CT C-spine without IV contrast:  Degenerative change of the cervical spine without osseous injury evident.                  CT head without IV contrast: No acute intracranial hemorrhage or large territory infarct is evident.                  CT angio for PE: No acute pulmonary artery emboli, acute bronchiolitis of right middle lobe.  Airspace opacity of left lower lobe concerning for pneumonia.  Multifocal mild atelectasis.                  CT abdomen pelvis with IV contrast: Prominent gallbladder recommend ultrasound for further evaluation.  Mild periportal edema.  Mild wall thickening of the stomach correlate for gastritis.  Wall thickening of the splenic flexure and descending colon suggesting colitis.  Significant distal colonic and rectal stool.  -Intervention: Zosyn 4.5 g, azithromycin 500 mg, LR 2.7 L, Zofran 4 mg, Toradol 15 mg, Tylenol 975 mg    PMH: paroxysmal atrial fibrillation (s/p PVI in 2016), CVA 03/2021, Hx multiple TIA, AVB s/p pacemaker, asthma, DLD, bilateral pseudophakia, MDD  Allergies: NKA  FH: Noncontributory to this presentation  SXH: Pacemaker insertion, appendectomy, hysterectomy with partial vaginectomy, loop recorder, tonsillectomy with adenoidectomy  SH: Denies current tobacco usage, former smoker with 8-pack-year history, endorses social EtOH usage, denies illicit substance usage, lives at home by herself.    CODE STATUS: DNR/DNI, okay with ICU level of care.    History reviewed. No pertinent past medical history.  Past Surgical History:   Procedure Laterality Date    MR HEAD ANGIO WO IV CONTRAST  3/30/2021    MR HEAD ANGIO WO IV CONTRAST 3/30/2021 [facility] CL[facility]ICAL LEGAC[facility]    MR NECK ANGIO WO IV CONTRAST  3/30/2021    MR NECK ANGIO WO IV CONTRAST 3/30/2021 [facility] CLINICAL LEGACY     (Not in a hospital admission)    Patient has no known allergies.  Social History     Tobacco Use    Smoking status: Never    Smokeless tobacco: Never   Substance Use Topics    Alcohol use: Not Currently    Drug use: Not Currently     No family  history on file.    Scheduled Medications:   azithromycin, 500 mg, intravenous, Once  [START ON 10/6/2024] azithromycin, 500 mg, intravenous, q24h  lactated Ringer's, 1,000 mL, intravenous, Once  magnesium oxide, 400 mg, oral, Daily  oxygen, , inhalation, Continuous - Inhalation  piperacillin-tazobactam, 3.375 g, intravenous, q8h  sodium chloride, 30 mL/kg, intravenous, Once         Continuous Medications:         PRN Medications:       Review of Systems:  Review of Systems  Patient denies all symptomatology pertaining to a 12 point ROS with exception of those listed above HPI.  Objective   Vitals:  24hr Min/Max:  Temp  Min: 36.7 °C (98.1 °F)  Max: 39.2 °C (102.6 °F)  Pulse  Min: 67  Max: 99  BP  Min: 85/51  Max: 126/60  Resp  Min: 16  Max: 24  SpO2  Min: 85 %  Max: 97 %    Physical exam:    Physical Exam  General: Not in acute distress, A&O x 3, alert, cooperative, ill-appearing  HEENT: Normocephalic, atraumatic, EOMI, moist mucous membranes, NC in place  Neck: Neck supple, trachea midline, no evidence of trauma, no evidence of JVD  Cardiovascular: RRR, S1 and S2 appreciated, no murmurs rubs gallops appreciated, distal pulses 2+ bilaterally (radial and dorsalis pedis)  Respiratory: Vesicular breath sounds appreciated bilaterally, no adventitious sounds appreciated, no increased work of breathing on 2 L via NC  GI: Abdomen soft, nondistended, nontender to palpation, bowel sounds present  Extremities: No edema appreciated in lower extremities bilaterally, no cyanosis  Neuro: A&O X3, no focal deficits, strength and sensation intact bilaterally  Skin: Warm and dry, without lesions or rashes    Assessment /Plan    Patient is an 89-year-old female with PMH significant for paroxysmal atrial fibrillation (s/p PVI in 2016), CVA 03/2021, Hx multiple TIA, AVB s/p pacemaker, asthma, DLD, bilateral pseudophakia, MDD.  Patient presented to [facility] for chief complaint of approximately 6 hours of fevers, chills, rigors, vomiting.   She stated this all started suddenly after eating lunch, and that she felt fine this morning with no symptoms whatsoever.  In the emergency department patient developed soft blood pressures with 80s over 40s, she will be admitted to the ICU preemptively for sepsis with hypovolemia without current septic/hypovolemic shock in case of the need of vasopressors.    Neuro:   -History: MDD, Hx CVA 03/2021, Hx multiple TIA  -Home meds: Citalopram  -GCS: E 4 V 5 M 6  -Pain: Tylenol 650 mg every 6 hours as needed  -Sedation: None at this time  -[facility] ICU  -Imaging: CT C-spine without IV contrast: Degenerative change of the cervical spine without osseous injury evident.                  CT head without IV contrast: No acute intracranial hemorrhage or large territory infarct is evident.                    Cardiac:   #Hypovolemia  #Hypotension, without hypovolemic shock  #Sepsis without septic shock  - History: Paroxysmal atrial fibrillation s/p PVI 2016, AVB s/p pacemaker, DLD  - Goals: [MAP> 65, HR ]  - Home meds: Lipitor 10 mg, sotalol 80 mg, Xarelto 15 mg  - Last echo: 03/21: LVEF 55-60%, impaired relaxation pattern of LV DF, mild AVR, RVSP WNL, small PFO appreciated  - Pressors: None at this time    Pulmonary:   #Hypoxia  #Community-acquired pneumonia (CAP)  -History: Asthma  -Home meds: None  -Continuous pulse oximetry   -Currently on 2 L via NC, O2 PRN to maintain SpO2 > 94%, wean as tolerated  -Imaging: CXR: No acute cardiopulmonary disease                  CT angio for PE: No acute pulmonary artery emboli, acute bronchiolitis of right middle lobe.  Airspace opacity of left lower lobe concerning for pneumonia.  Multifocal mild atelectasis.  -Continue Zosyn and azithromycin for antimicrobial coverage    Gastrointestinal:   #Likely viral gastroenteritis  -History: None  -Home meds: None  - Diet: Regular  - Supplementation: None  - Prophylaxis: protonix  - Bowel regimen: miralax  -Antiemetic: Zofran 4 mg every 8 hours  as needed  - Last BM: Last BM Date: 10/04/24  -Imaging: CT abdomen pelvis with IV contrast: Prominent gallbladder recommend ultrasound for further evaluation.  Mild periportal edema.  Mild wall thickening of the stomach correlate for gastritis.  Wall thickening of the splenic flexure and descending colon suggesting colitis.  Significant distal colonic and rectal stool.  --> RUQ US ordered    Renal:   - Daily RFP,Mg,Phos  - History: None  - Home meds: None  -Continue strict I/O  Net IO Since Admission: No IO data has been entered for this period [10/05/24 2015]  Results from last 72 hours   Lab Units 10/05/24  1554   BUN mg/dL 18   CREATININE mg/dL 0.98   - Fluids: S/p 2.7 L IVF in the ED, no maintenance at this time  - Electrolytes: Replete per protocol, goal K>4 Phos >3 Mg >2    Endocrine:   -History: None  -Home meds: None  -sliding scale: None at this time we will continue to monitor blood glucose levels and add SSI as indicated  Results from last 7 days   Lab Units 10/05/24  1554   GLUCOSE mg/dL 104*   -BG < 180 goal    Hematology:   #Pancytopenia  -WBC 4.1, Hgb 11.3, platelet 94  - Daily CBC, coags  -History: A-fib on Xarelto  -Home meds: Xarelto  Results from last 7 days   Lab Units 10/05/24  1554   HEMOGLOBIN g/dL 11.3*   HEMATOCRIT % 34.8*   PLATELETS AUTO x10*3/uL 94*   - DVT Prophylaxis: Home Xarelto, SCDs  -Transfuse if hemoglobin less than 7    Infectious Disease:  #Sepsis (SIRS 4/4)  #Likely viral gastroenteritis  #Community-acquired pneumonia (CAP)  -History: None  -Home meds: None  Results from last 7 days   Lab Units 10/05/24  1554   WBC AUTO x10*3/uL 4.1*   Temp (24hrs), Av °C (100.4 °F), Min:36.7 °C (98.1 °F), Max:39.2 °C (102.6 °F)  -MRSA, COVID, flu PCR's all negative  -Abx: Zosyn, azithromycin  -Cultures: Blood cultures X2, sputum culture ordered,  -Strep pneumonia and Legionella urine antigens ordered  -Procalcitonin ordered    MSK:  -No acute issues being managed at this time  -PT to  see  -OT to see    Integumentary:  -No acute issues being managed at this time    LDA: PIV         CODE STATUS: DNR, DNI ICU acceptable      Case discussed with attending , Dr. Ruben Marie M.D.  Internal Medicine PGY-2

## 2024-10-06 NOTE — CARE PLAN
Problem: Respiratory  Goal: Verbalize decreased shortness of breath this shift  Outcome: Not Progressing  Goal: Wean oxygen to maintain O2 saturation per order/standard this shift  Outcome: Not Progressing     Problem: Pain - Adult  Goal: Verbalizes/displays adequate comfort level or baseline comfort level  Outcome: Progressing     Problem: Safety - Adult  Goal: Free from fall injury  Outcome: Progressing     Problem: Discharge Planning  Goal: Discharge to home or other facility with appropriate resources  Outcome: Progressing     Problem: Chronic Conditions and Co-morbidities  Goal: Patient's chronic conditions and co-morbidity symptoms are monitored and maintained or improved  Outcome: Progressing     Problem: Fall/Injury  Goal: Verbalize understanding of personal risk factors for fall in the hospital  Outcome: Progressing  Goal: Verbalize understanding of risk factor reduction measures to prevent injury from fall in the home  Outcome: Progressing     Problem: Infection prevention/bleeding  Goal: Infection s/sx managed  Outcome: Progressing  Goal: No further progression of infection  Outcome: Progressing     Problem: Perfusion/Cardiac  Goal: Adequate perfusion to organs/extremities  Outcome: Progressing  Goal: Hemodynamically stable  Outcome: Progressing     Problem: Respiratory/Oxygenation  Goal: Tolerates activity without increased O2 demands  Outcome: Progressing     Problem: Neuro/Coping  Goal: No signs of neurological compromise  Outcome: Progressing     Problem: Fluid/Electrolyte/Nutrition  Goal: Normal electrolyte levels  Outcome: Progressing  Goal: Tolerates nutritional intake  Outcome: Progressing     Problem: Pain  Goal: Turns in bed with improved pain control throughout the shift  Outcome: Met  Goal: Walks with improved pain control throughout the shift  Outcome: Met  Goal: Performs ADL's with improved pain control throughout shift  Outcome: Met     Problem: Fall/Injury  Goal: Not fall by end of  shift  Outcome: Met  Goal: Be free from injury by end of the shift  Outcome: Met  Goal: Use assistive devices by end of the shift  Outcome: Met  Goal: Pace activities to prevent fatigue by end of the shift  Outcome: Met   The patient's goals for the shift include      The clinical goals for the shift include Patient will remain hemodynamically stable through the end of this shift    Over the shift, the patient did not make progress toward the following goals. Barriers to progression include acuteness of illness. Recommendations to address these barriers include continue with the plan of care.    Problem: Respiratory  Goal: Verbalize decreased shortness of breath this shift  Outcome: Not Progressing  Goal: Wean oxygen to maintain O2 saturation per order/standard this shift  Outcome: Not Progressing

## 2024-10-06 NOTE — PROGRESS NOTES
Critical Care Daily Progress        Subjective   Patient is a 89 y.o. female admitted on 10/5/2024  3:20 PM with the following indication(s) for ICU care sepsis 2/2 pneumonia. PMH significant for paroxysmal atrial fibrillation (s/p PVI in 2016), CVA 03/2021, Hx multiple TIA, AVB s/p pacemaker, asthma, DLD, bilateral pseudophakia, MDD.      Overnight Events: No over night even.     Complaints: has none..     Summary of management plan  - Blood smear for thrombocytopenia  - Haptoglobin for Anemia  - 500 ml LR  - Follow up with Abdominal Ultrasound   - Procalcitonin 12.64  - Zosyn and Zithromax Dc'd  - Started Rocephin  - Lipase is normal      No pulmonary artery emboli. Acute bronchiolitis of right middle lobe.  Airspace opacity left lower lobe concerning for pneumonia. Multifocal mild atelectasis. Abdomen:  Prominent gallbladder.  Recommend ultrasound for further evaluation.  Mild periportal edema. Mild wall thickening of the stomach.  Please correlate for gastritis. Pelvis:  Wall thickening of the splenic flexure and descending colon suggesting colitis.  Significant distal colonic and rectal stool.      Scheduled medications  atorvastatin, 10 mg, oral, Nightly  cefTRIAXone, 2 g, intravenous, q24h  cholecalciferol, 2,000 Units, oral, Daily  citalopram, 10 mg, oral, Daily  magnesium oxide, 400 mg, oral, Daily  pantoprazole, 40 mg, oral, Daily before breakfast  polyethylene glycol, 17 g, oral, Daily  rivaroxaban, 15 mg, oral, Daily  sotalol, 40 mg, oral, Daily      Continuous medications     PRN medications  PRN medications: acetaminophen, calcium gluconate, calcium gluconate, magnesium sulfate, magnesium sulfate, ondansetron, oxygen, potassium chloride CR **OR** potassium chloride, potassium chloride CR **OR** potassium chloride     Objective   Vitals:  Temp  Min: 36.7 °C (98.1 °F)  Max: 39.2 °C (102.6 °F)  Pulse  Min: 58  Max: 99  BP  Min: 83/45  Max: 126/60  Resp  Min: 15  Max: 32  SpO2  Min: 85 %  Max: 100  %    Physical Exam:   Physical Exam   Physical Exam  Vitals and nursing note reviewed.   Constitutional:       General: She is not in acute distress.     Appearance: Normal appearance. She is normal weight. She is not ill-appearing, toxic-appearing or diaphoretic.   HENT:      Head: Normocephalic and atraumatic.      Right Ear: External ear normal.      Left Ear: External ear normal.      Nose: No congestion or rhinorrhea.      Mouth/Throat:      Pharynx: No oropharyngeal exudate or posterior oropharyngeal erythema.   Eyes:      General:         Right eye: No discharge.         Left eye: No discharge.      Extraocular Movements: Extraocular movements intact.      Conjunctiva/sclera: Conjunctivae normal.      Pupils: Pupils are equal, round, and reactive to light.   Cardiovascular:      Rate and Rhythm: Normal rate and regular rhythm.      Pulses: Normal pulses.      Heart sounds: Normal heart sounds. No murmur heard.     No friction rub. No gallop.   Pulmonary:      Effort: Pulmonary effort is normal. No respiratory distress.      Breath sounds: Normal breath sounds. No stridor. No wheezing or rhonchi.   Abdominal:      General: Abdomen is flat. There is no distension.      Palpations: Abdomen is soft.      Tenderness: There is no guarding or rebound.   Musculoskeletal:         General: No swelling, deformity or signs of injury. Normal range of motion.      Cervical back: Normal range of motion. No rigidity.   Skin:     General: Skin is warm.      Capillary Refill: Capillary refill takes less than 2 seconds.      Coloration: Skin is not pale.      Findings: No bruising or lesion.   Neurological:      General: No focal deficit present.      Mental Status: She is alert and oriented to person, place, and time. Mental status is at baseline.      Sensory: No sensory deficit.      Motor: No weakness.   Psychiatric:         Mood and Affect: Mood normal.         Behavior: Behavior normal.         Thought Content: Thought  content normal.       Results for orders placed or performed during the hospital encounter of 10/05/24 (from the past 24 hour(s))   CBC and Auto Differential   Result Value Ref Range    WBC 4.1 (L) 4.4 - 11.3 x10*3/uL    nRBC 0.0 0.0 - 0.0 /100 WBCs    RBC 3.71 (L) 4.00 - 5.20 x10*6/uL    Hemoglobin 11.3 (L) 12.0 - 16.0 g/dL    Hematocrit 34.8 (L) 36.0 - 46.0 %    MCV 94 80 - 100 fL    MCH 30.5 26.0 - 34.0 pg    MCHC 32.5 32.0 - 36.0 g/dL    RDW 12.8 11.5 - 14.5 %    Platelets 94 (L) 150 - 450 x10*3/uL    Neutrophils % 84.0 40.0 - 80.0 %    Immature Granulocytes %, Automated 1.5 (H) 0.0 - 0.9 %    Lymphocytes % 10.4 13.0 - 44.0 %    Monocytes % 2.2 2.0 - 10.0 %    Eosinophils % 1.7 0.0 - 6.0 %    Basophils % 0.2 0.0 - 2.0 %    Neutrophils Absolute 3.46 1.60 - 5.50 x10*3/uL    Immature Granulocytes Absolute, Automated 0.06 0.00 - 0.50 x10*3/uL    Lymphocytes Absolute 0.43 (L) 0.80 - 3.00 x10*3/uL    Monocytes Absolute 0.09 0.05 - 0.80 x10*3/uL    Eosinophils Absolute 0.07 0.00 - 0.40 x10*3/uL    Basophils Absolute 0.01 0.00 - 0.10 x10*3/uL   Comprehensive Metabolic Panel   Result Value Ref Range    Glucose 104 (H) 74 - 99 mg/dL    Sodium 140 136 - 145 mmol/L    Potassium 4.1 3.5 - 5.3 mmol/L    Chloride 100 98 - 107 mmol/L    Bicarbonate 28 21 - 32 mmol/L    Anion Gap 16 10 - 20 mmol/L    Urea Nitrogen 18 6 - 23 mg/dL    Creatinine 0.98 0.50 - 1.05 mg/dL    eGFR 56 (L) >60 mL/min/1.73m*2    Calcium 9.4 8.6 - 10.3 mg/dL    Albumin 4.1 3.4 - 5.0 g/dL    Alkaline Phosphatase 69 33 - 136 U/L    Total Protein 7.3 6.4 - 8.2 g/dL    AST 36 9 - 39 U/L    Bilirubin, Total 0.8 0.0 - 1.2 mg/dL    ALT 31 7 - 45 U/L   Lactate   Result Value Ref Range    Lactate 1.7 0.4 - 2.0 mmol/L   Blood Culture    Specimen: Peripheral Venipuncture; Blood culture   Result Value Ref Range    Blood Culture Loaded on Instrument - Culture in progress    Blood Culture    Specimen: Peripheral Venipuncture; Blood culture   Result Value Ref Range     Blood Culture Loaded on Instrument - Culture in progress    Coagulation Screen   Result Value Ref Range    Protime 16.6 (H) 9.8 - 12.8 seconds    INR 1.5 (H) 0.9 - 1.1    aPTT 30 27 - 38 seconds   Magnesium   Result Value Ref Range    Magnesium 1.58 (L) 1.60 - 2.40 mg/dL   Troponin I, High Sensitivity, Initial   Result Value Ref Range    Troponin I, High Sensitivity 10 0 - 13 ng/L   Morphology   Result Value Ref Range    RBC Morphology No significant RBC morphology present    Sars-CoV-2 PCR   Result Value Ref Range    Coronavirus 2019, PCR Not Detected Not Detected   Influenza A, and B PCR   Result Value Ref Range    Flu A Result Not Detected Not Detected    Flu B Result Not Detected Not Detected   Urinalysis with Reflex Culture and Microscopic   Result Value Ref Range    Color, Urine Light-Yellow Light-Yellow, Yellow, Dark-Yellow    Appearance, Urine Clear Clear    Specific Gravity, Urine 1.013 1.005 - 1.035    pH, Urine 5.5 5.0, 5.5, 6.0, 6.5, 7.0, 7.5, 8.0    Protein, Urine NEGATIVE NEGATIVE, 10 (TRACE), 20 (TRACE) mg/dL    Glucose, Urine Normal Normal mg/dL    Blood, Urine NEGATIVE NEGATIVE    Ketones, Urine NEGATIVE NEGATIVE mg/dL    Bilirubin, Urine NEGATIVE NEGATIVE    Urobilinogen, Urine Normal Normal mg/dL    Nitrite, Urine NEGATIVE NEGATIVE    Leukocyte Esterase, Urine NEGATIVE NEGATIVE   Extra Urine Gray Tube   Result Value Ref Range    Extra Tube Hold for add-ons.    Streptococcus pneumoniae Antigen, Urine    Specimen: Clean Catch/Voided; Urine   Result Value Ref Range    Streptococcus pneumoniae Ag, Urine Negative Negative   Legionella Antigen, Urine    Specimen: Clean Catch/Voided; Urine   Result Value Ref Range    L. pneumophila Urine Ag Negative Negative   MRSA Surveillance for Vancomycin De-escalation, PCR    Specimen: Anterior Nares; Swab   Result Value Ref Range    MRSA PCR Not Detected Not Detected   Troponin, High Sensitivity, 1 Hour   Result Value Ref Range    Troponin I, High Sensitivity 17  (H) 0 - 13 ng/L   Procalcitonin   Result Value Ref Range    Procalcitonin 12.64 (H) <=0.07 ng/mL   CBC   Result Value Ref Range    WBC 6.7 4.4 - 11.3 x10*3/uL    nRBC 0.0 0.0 - 0.0 /100 WBCs    RBC 3.50 (L) 4.00 - 5.20 x10*6/uL    Hemoglobin 10.8 (L) 12.0 - 16.0 g/dL    Hematocrit 34.2 (L) 36.0 - 46.0 %    MCV 98 80 - 100 fL    MCH 30.9 26.0 - 34.0 pg    MCHC 31.6 (L) 32.0 - 36.0 g/dL    RDW 13.1 11.5 - 14.5 %    Platelets 96 (L) 150 - 450 x10*3/uL   Comprehensive Metabolic Panel   Result Value Ref Range    Glucose 133 (H) 74 - 99 mg/dL    Sodium 138 136 - 145 mmol/L    Potassium 4.2 3.5 - 5.3 mmol/L    Chloride 104 98 - 107 mmol/L    Bicarbonate 27 21 - 32 mmol/L    Anion Gap 11 10 - 20 mmol/L    Urea Nitrogen 16 6 - 23 mg/dL    Creatinine 1.02 0.50 - 1.05 mg/dL    eGFR 53 (L) >60 mL/min/1.73m*2    Calcium 8.1 (L) 8.6 - 10.3 mg/dL    Albumin 3.0 (L) 3.4 - 5.0 g/dL    Alkaline Phosphatase 56 33 - 136 U/L    Total Protein 5.6 (L) 6.4 - 8.2 g/dL    AST 47 (H) 9 - 39 U/L    Bilirubin, Total 0.6 0.0 - 1.2 mg/dL    ALT 37 7 - 45 U/L   Phosphorus   Result Value Ref Range    Phosphorus 4.2 2.5 - 4.9 mg/dL   Magnesium   Result Value Ref Range    Magnesium 2.46 (H) 1.60 - 2.40 mg/dL   Lipase   Result Value Ref Range    Lipase 19 9 - 82 U/L   CBC and Auto Differential   Result Value Ref Range    WBC 6.3 4.4 - 11.3 x10*3/uL    nRBC 0.0 0.0 - 0.0 /100 WBCs    RBC 3.60 (L) 4.00 - 5.20 x10*6/uL    Hemoglobin 10.9 (L) 12.0 - 16.0 g/dL    Hematocrit 34.3 (L) 36.0 - 46.0 %    MCV 95 80 - 100 fL    MCH 30.3 26.0 - 34.0 pg    MCHC 31.8 (L) 32.0 - 36.0 g/dL    RDW 13.0 11.5 - 14.5 %    Platelets 100 (L) 150 - 450 x10*3/uL    Neutrophils % 88.9 40.0 - 80.0 %    Immature Granulocytes %, Automated 0.3 0.0 - 0.9 %    Lymphocytes % 6.7 13.0 - 44.0 %    Monocytes % 3.0 2.0 - 10.0 %    Eosinophils % 0.6 0.0 - 6.0 %    Basophils % 0.5 0.0 - 2.0 %    Neutrophils Absolute 5.60 (H) 1.60 - 5.50 x10*3/uL    Immature Granulocytes Absolute, Automated  0.02 0.00 - 0.50 x10*3/uL    Lymphocytes Absolute 0.42 (L) 0.80 - 3.00 x10*3/uL    Monocytes Absolute 0.19 0.05 - 0.80 x10*3/uL    Eosinophils Absolute 0.04 0.00 - 0.40 x10*3/uL    Basophils Absolute 0.03 0.00 - 0.10 x10*3/uL   Morphology   Result Value Ref Range    RBC Morphology See Below     Giant Platelets Few            CT angio chest for pulmonary embolism    Result Date: 10/5/2024  STUDY: CT Angiogram of the Chest, CT Abdomen and Pelvis with IV Contrast; 10/5/2024 5:12 PM INDICATION: Evaluate for pulmonary embolism or other infectious process. Shortness of breath.  Hypoxia.  Abdominal pain with nausea and emesis. COMPARISON: XR chest same day 4:16 PM.  ACCESSION NUMBER(S): [id number], [id number] ORDERING CLINICIAN: SHANNEN CUI TECHNIQUE:  CTA of the chest was performed following rapid injection of intravenous contrast.  Images are reviewed and processed at a workstation according to the CT angiogram protocol with 3-D and/or MIP post processing imaging generated.  CT of the abdomen and pelvis was performed with intravenous contrast.  Omnipaque 350 75 mL was administered intravenously. Automated mA/kV exposure control was utilized and patient examination was performed in strict accordance with principles of ALARA. FINDINGS:  CTA CHEST: Pulmonary arteries are adequately opacified without acute or chronic filling defects.  The thoracic aorta is normal in course and caliber without dissection or aneurysm. The heart is normal in size without pericardial effusion.  Thoracic lymph nodes are not enlarged.  Left subclavian pacer device is demonstrated. There is no pleural effusion, pleural thickening, or pneumothorax. The airways are patent. There is airspace opacity left lower lobe concerning for pneumonia.. There is mild atelectasis of the left lower lobe and lingular lobe. There are small tree-in-bud nodular densities in the right middle lobe suggesting acute bronchiolitis.  There is atelectasis in the medial  aspect of the right middle lobe. ABDOMEN:  LIVER: No hepatomegaly.  Smooth surface contour.  Is fatty liver morphology..  There is mild periportal edema.  BILE DUCTS: No intrahepatic or extrahepatic biliary ductal dilatation.  GALLBLADDER: The gallbladder is prominent.  STOMACH: Mild wall thickening of the stomach concerning for gastritis.  PANCREAS: No masses or ductal dilatation.  SPLEEN: No splenomegaly or focal splenic lesion.  Calcified granuloma redemonstrated within the spleen.  ADRENAL GLANDS: No thickening or nodules.  KIDNEYS AND URETERS: Kidneys are normal in size and location.  No renal or ureteral calculi.  Small hypodensities in the left kidney suggest renal cysts.  PELVIS:  BLADDER: No abnormalities identified.  REPRODUCTIVE ORGANS: No abnormalities identified.  BOWEL: There is significant distal colonic and rectal stool.  There is bowel wall thickening in the colon in the descending and splenic flexure.  VESSELS: No abnormalities identified.  Abdominal aorta is normal in caliber.  PERITONEUM/RETROPERITONEUM/LYMPH NODES: No free fluid.  No pneumoperitoneum. No lymphadenopathy.  ABDOMINAL WALL: No abnormalities identified. SOFT TISSUES: No abnormalities identified.  BONES: No acute fracture or aggressive osseous lesion.  Thre is rightward convexity of the lumbar spine.  There is disc space narrowing and endplate degenerative changes of the lumbar spine.    Chest:  No pulmonary artery emboli. Acute bronchiolitis of right middle lobe.  Airspace opacity left lower lobe concerning for pneumonia. Multifocal mild atelectasis. Abdomen:  Prominent gallbladder.  Recommend ultrasound for further evaluation.  Mild periportal edema. Mild wall thickening of the stomach.  Please correlate for gastritis. Pelvis:  Wall thickening of the splenic flexure and descending colon suggesting colitis.  Significant distal colonic and rectal stool. Signed by Faheem Kumar, DO    CT abdomen pelvis w IV contrast    Result  Date: 10/5/2024  STUDY: CT Angiogram of the Chest, CT Abdomen and Pelvis with IV Contrast; 10/5/2024 5:12 PM INDICATION: Evaluate for pulmonary embolism or other infectious process. Shortness of breath.  Hypoxia.  Abdominal pain with nausea and emesis. COMPARISON: XR chest same day 4:16 PM.  ACCESSION NUMBER(S): [id number], [id number] ORDERING CLINICIAN: SHANNEN CUI TECHNIQUE:  CTA of the chest was performed following rapid injection of intravenous contrast.  Images are reviewed and processed at a workstation according to the CT angiogram protocol with 3-D and/or MIP post processing imaging generated.  CT of the abdomen and pelvis was performed with intravenous contrast.  Omnipaque 350 75 mL was administered intravenously. Automated mA/kV exposure control was utilized and patient examination was performed in strict accordance with principles of ALARA. FINDINGS:  CTA CHEST: Pulmonary arteries are adequately opacified without acute or chronic filling defects.  The thoracic aorta is normal in course and caliber without dissection or aneurysm. The heart is normal in size without pericardial effusion.  Thoracic lymph nodes are not enlarged.  Left subclavian pacer device is demonstrated. There is no pleural effusion, pleural thickening, or pneumothorax. The airways are patent. There is airspace opacity left lower lobe concerning for pneumonia.. There is mild atelectasis of the left lower lobe and lingular lobe. There are small tree-in-bud nodular densities in the right middle lobe suggesting acute bronchiolitis.  There is atelectasis in the medial aspect of the right middle lobe. ABDOMEN:  LIVER: No hepatomegaly.  Smooth surface contour.  Is fatty liver morphology..  There is mild periportal edema.  BILE DUCTS: No intrahepatic or extrahepatic biliary ductal dilatation.  GALLBLADDER: The gallbladder is prominent.  STOMACH: Mild wall thickening of the stomach concerning for gastritis.  PANCREAS: No masses or ductal  dilatation.  SPLEEN: No splenomegaly or focal splenic lesion.  Calcified granuloma redemonstrated within the spleen.  ADRENAL GLANDS: No thickening or nodules.  KIDNEYS AND URETERS: Kidneys are normal in size and location.  No renal or ureteral calculi.  Small hypodensities in the left kidney suggest renal cysts.  PELVIS:  BLADDER: No abnormalities identified.  REPRODUCTIVE ORGANS: No abnormalities identified.  BOWEL: There is significant distal colonic and rectal stool.  There is bowel wall thickening in the colon in the descending and splenic flexure.  VESSELS: No abnormalities identified.  Abdominal aorta is normal in caliber.  PERITONEUM/RETROPERITONEUM/LYMPH NODES: No free fluid.  No pneumoperitoneum. No lymphadenopathy.  ABDOMINAL WALL: No abnormalities identified. SOFT TISSUES: No abnormalities identified.  BONES: No acute fracture or aggressive osseous lesion.  Thre is rightward convexity of the lumbar spine.  There is disc space narrowing and endplate degenerative changes of the lumbar spine.    Chest:  No pulmonary artery emboli. Acute bronchiolitis of right middle lobe.  Airspace opacity left lower lobe concerning for pneumonia. Multifocal mild atelectasis. Abdomen:  Prominent gallbladder.  Recommend ultrasound for further evaluation.  Mild periportal edema. Mild wall thickening of the stomach.  Please correlate for gastritis. Pelvis:  Wall thickening of the splenic flexure and descending colon suggesting colitis.  Significant distal colonic and rectal stool. Signed by Faheem Kumar, DO    CT cervical spine wo IV contrast    Result Date: 10/5/2024  Interpreted By:  Demetrius Barlow, STUDY: CT of the cervical spine  without contrast dated  10/5/2024.   INDICATION: Pain; trauma.   COMPARISON: None.   ACCESSION NUMBER(S): [id number]   ORDERING CLINICIAN: SHANNEN CUI   TECHNIQUE: Axial CT of the cervical spine was performed  without intravenous contrast.   Sagittal and coronal two-dimensional reformats  were obtained.   FINDINGS: OSSEOUS STRUCTURES:   The bones are demineralized. The cervical spine is seen to  C7/T1. Vertebral body height and alignment are maintained.  The dens and lateral masses are intact.  No fracture or dislocation is evident. There is mild-to-moderate multilevel discogenic and facet degenerative change of the visualized spine. Mild neural foraminal narrowing is seen at multiple levels in the midcervical spine. Posterior disc osteophyte complexes cause impression of the ventral thecal sac at multiple levels in the mid cervical spine without edgar central canal narrowing evident as seen on noncontrast CT.   SOFT TISSUES:   No prevertebral soft tissue swelling is evident.  Mucosal thickening is seen in the bases of the maxillary sinuses. Calcified atheromatous disease is seen in the visualized arterial tree. Scarring and/or atelectasis is seen at the lung apices. There is partial visualization of pulse generator leads in the left subclavian region..       Degenerative change of the cervical spine without osseous injury evident.   Signed by: Demetrius Barlow 10/5/2024 6:17 PM Dictation workstation:   [id number]    CT head wo IV contrast    Result Date: 10/5/2024  Interpreted By:  Demetrius Barlow, STUDY: CT of the brain without contrast dated  10/5/2024.   INDICATION: Signs/Symptoms:nausea/vomiting, dizziness   COMPARISON: CT dated 03/29/2011   ACCESSION NUMBER(S): [id number]   ORDERING CLINICIAN: SHANNEN CUI   TECHNIQUE: Axial non-contrast CT of the brain was performed. Sagittal and coronal 2D reformats were obtained.   FINDINGS: BRAIN PARENCHYMA:   No acute intracranial hemorrhage or infarction is evident.  There are deep and periventricular white matter hypodensities compatible with chronic ischemic demyelination.   VENTRICLES AND EXTRA-AXIAL SPACES:   No hydrocephalus or midline shift is evident.  There is mild cerebral volume loss with ex vacuo dilation of the ventricles.   INTRACRANIAL  VESSELS:   Calcified atheromatous disease is seen of the visualized carotid and vertebrobasilar arterial trees.   SINUSES AND MASTOIDS:   Mucosal thickening is seen in the bases of the maxillary sinuses.   OSSEOUS STRUCTURES:   No osseous injury is evident.   SOFT TISSUES:   Visualized associated soft tissues are grossly unremarkable.       No acute intracranial hemorrhage or large territory infarction is evident.   Signed by: Demetrius Barlow 10/5/2024 6:15 PM Dictation workstation:   [id number]    XR chest 1 view    Result Date: 10/5/2024  STUDY: Chest Radiograph;  10/05/2024 INDICATION: Hypoxia. COMPARISON: XR chest 03/29/2021. ACCESSION NUMBER(S): [id number] ORDERING CLINICIAN: SHANNEN CUI TECHNIQUE:  Frontal chest was obtained at 16:36 hours. FINDINGS: CARDIOMEDIASTINAL SILHOUETTE: Cardiomediastinal silhouette is normal in size and configuration. Left subclavian pacer device is demonstrated.  LUNGS: Lungs are clear.  There is no pneumothorax or pleural effusion.  ABDOMEN: No remarkable upper abdominal findings.  BONES: No acute osseous changes.    No acute cardiopulmonary disease. Signed by Faheem Kumar, DO        Assessment/Plan   Overall Assessment:  Patient is an 89-year-old female with PMH significant for paroxysmal atrial fibrillation (s/p PVI in 2016), CVA 03/2021, Hx multiple TIA, AVB s/p pacemaker, asthma, DLD, bilateral pseudophakia, MDD.  Patient presented to [facility] for chief complaint of approximately 6 hours of fevers, chills, rigors, vomiting.  She stated this all started suddenly after eating lunch, and that she felt fine this morning with no symptoms whatsoever.  In the emergency department patient developed soft blood pressures with 80s over 40s, she will be admitted to the ICU preemptively for sepsis with hypovolemia without current septic/hypovolemic shock in case of the need of vasopressors.     Neuro:   -History: MDD, Hx CVA 03/2021, Hx multiple TIA  -Home meds: Citalopram  -GCS: E 4 V 5  M 6  -Pain: Tylenol 650 mg every 6 hours as needed  -Sedation: None at this time  -[facility]  -Imaging: CT C-spine without IV contrast: Degenerative change of the cervical spine without osseous injury evident.                  CT head without IV contrast: No acute intracranial hemorrhage or large territory infarct is evident.                    Cardiac:   #Hypovolemia  #Hypotension, without hypovolemic shock  #Sepsis without septic shock  - History: Paroxysmal atrial fibrillation s/p PVI 2016, AVB s/p pacemaker, DLD  - Goals: [MAP> 65, HR ]  - Home meds: Lipitor 10 mg, sotalol 80 mg, Xarelto 15 mg  - Last echo: 03/21: LVEF 55-60%, impaired relaxation pattern of LV DF, mild AVR, RVSP WNL, small PFO appreciated  - Pressors: None at this time     Pulmonary:   #Hypoxia  #Community-acquired pneumonia (CAP)  Procalcitonin 12.64  -History: Asthma  -Home meds: None  -Continuous pulse oximetry   -Currently on 2 L via NC, O2 PRN to maintain SpO2 > 94%, wean as tolerated  -Imaging: CXR: No acute cardiopulmonary disease                  CT angio for PE: No acute pulmonary artery emboli, acute bronchiolitis of right middle lobe.  Airspace opacity of left lower lobe concerning for pneumonia.  Multifocal mild atelectasis.  -Continue Zosyn and azithromycin for antimicrobial coverage    Gastrointestinal: #Likely viral gastroenteritis  Results from last 7 days   Lab Units 10/06/24  0325 10/05/24  1554   INR   --  1.5*   APTT seconds  --  30   ALK PHOS U/L 56 69   AST U/L 47* 36   ALT U/L 37 31     -History: None  -Home meds: None  - Diet: Regular  - Supplementation: None  - Prophylaxis: protonix  - Bowel regimen: miralax  -Antiemetic: Zofran 4 mg every 8 hours as needed  - Last BM: Last BM Date: 10/06/24  -Imaging: CT abdomen pelvis with IV contrast: Prominent gallbladder recommend ultrasound for further evaluation.  Mild periportal edema.  Mild wall thickening of the stomach correlate for gastritis.  Wall thickening of the  splenic flexure and descending colon suggesting colitis.  Significant distal colonic and rectal stool.  --> RUQ US ordered    Renal:   Results from last 7 days   Lab Units 10/06/24  0325 10/05/24  1554   SODIUM mmol/L 138 140   POTASSIUM mmol/L 4.2 4.1   MAGNESIUM mg/dL 2.46* 1.58*   PHOSPHORUS mg/dL 4.2  --    BUN mg/dL 16 18   CREATININE mg/dL 1.02 0.98       Net IO Since Admission: 2,050 mL [10/06/24 0718]  - Daily CMP,Mg,Phos  - [place] with strict I/O   - Fluids:  mL/hr  - Electrolytes: Replete per protocol, goal K>4 Phos >3 Mg >2    Endocrine:   Results from last 7 days   Lab Units 10/06/24  0325 10/05/24  1554   GLUCOSE mg/dL 133* 104*    -History: None  -Home meds: None  -sliding scale: None at this time we will continue to monitor blood glucose levels and add SSI as indicated  -BG < 180 goal    Hematology: #Pancytopenia  Results from last 7 days   Lab Units 10/06/24  0325 10/05/24  1554   HEMOGLOBIN g/dL 10.8* 11.3*   HEMATOCRIT % 34.2* 34.8*   PLATELETS AUTO x10*3/uL 96* 94*   - Daily CBC, coags  -History: A-fib on Xarelto  -Home meds: Xarelto  - DVT Prophylaxis: Home Xarelto, SCDs  -Transfuse if hemoglobin less than 7    Infectious Disease:   #Sepsis (SIRS 4/4) @adm  #Likely viral gastroenteritis  #Community-acquired pneumonia (CAP)  Results from last 7 days   Lab Units 10/06/24  0325 10/05/24  1554   WBC AUTO x10*3/uL 6.7 4.1*     Temp (24hrs), Av.4 °C (99.4 °F), Min:36.7 °C (98.1 °F), Max:39.2 °C (102.6 °F)     -MRSA, COVID, flu PCR's all negative  -Abx: Rocephin  -Cultures: Blood cultures X2, sputum culture ordered,  -Strep pneumonia and Legionella urine antigens ordered  -Procalcitonin 12.64    Musculoskeletal:   - PT to see   - OT to see     LDA:  External Urinary Catheter (Active)   Placement Date/Time: 10/05/24 7270     Number of days: 0         CODE STATUS: DNR and No Intubation    Disposition: To remain in the ICU until medically cleared to leave the unit.    RESTRAINTS: type: None    ABCDEF  Checklist  Analgesia: Spontaneous awakening trial to be pursued if clinically appropriate. RASS goal reviewed  Breathing: Spontaneous breathing trial to be pursued if clinically appropriate. Mechanical power of assisted ventilation reviewed  Choice of analgesia/sedation: Analgesic and sedative agents adjusted per clinical context.   Delirium assessed by CAM, will avoid exacerbating factors  Early mobility and exercise: Physical and occupational therapy engaged  Family: Plan of care, overall trajectory of patient shared with family. Questions elicited and answered as appropriate.     Due to the high probability of life threatening clinical decompensation, the patient required critical care time evaluating and managing this patient.  Critical care time included obtaining a history, examining the patient, ordering and reviewing studies, discussing, developing, and implementing a management plan, evaluating the patient's response to treatment, and discussion with other care team providers. I saw and evaluated the patient myself.  Critical care time was performed exclusive of billable procedures.    Critical care time:       Case discussed with attending , Dr. Miranda.  Jac Krueger MD

## 2024-10-07 ENCOUNTER — APPOINTMENT (OUTPATIENT)
Dept: RADIOLOGY | Facility: HOSPITAL | Age: 89
DRG: 871 | End: 2024-10-07
Payer: MEDICARE

## 2024-10-07 LAB
ALBUMIN SERPL BCP-MCNC: 2.9 G/DL (ref 3.4–5)
ALP SERPL-CCNC: 59 U/L (ref 33–136)
ALT SERPL W P-5'-P-CCNC: 34 U/L (ref 7–45)
ANION GAP SERPL CALC-SCNC: 8 MMOL/L (ref 10–20)
AST SERPL W P-5'-P-CCNC: 32 U/L (ref 9–39)
BASOPHILS # BLD AUTO: 0.04 X10*3/UL (ref 0–0.1)
BASOPHILS NFR BLD AUTO: 0.6 %
BILIRUB SERPL-MCNC: 0.3 MG/DL (ref 0–1.2)
BUN SERPL-MCNC: 15 MG/DL (ref 6–23)
CALCIUM SERPL-MCNC: 7.8 MG/DL (ref 8.6–10.3)
CHLORIDE SERPL-SCNC: 103 MMOL/L (ref 98–107)
CO2 SERPL-SCNC: 29 MMOL/L (ref 21–32)
CREAT SERPL-MCNC: 0.84 MG/DL (ref 0.5–1.05)
EGFRCR SERPLBLD CKD-EPI 2021: 67 ML/MIN/1.73M*2
EOSINOPHIL # BLD AUTO: 0.29 X10*3/UL (ref 0–0.4)
EOSINOPHIL NFR BLD AUTO: 4.3 %
ERYTHROCYTE [DISTWIDTH] IN BLOOD BY AUTOMATED COUNT: 13.2 % (ref 11.5–14.5)
GLUCOSE SERPL-MCNC: 88 MG/DL (ref 74–99)
HCT VFR BLD AUTO: 31 % (ref 36–46)
HGB BLD-MCNC: 9.8 G/DL (ref 12–16)
IMM GRANULOCYTES # BLD AUTO: 0.02 X10*3/UL (ref 0–0.5)
IMM GRANULOCYTES NFR BLD AUTO: 0.3 % (ref 0–0.9)
LYMPHOCYTES # BLD AUTO: 1.46 X10*3/UL (ref 0.8–3)
LYMPHOCYTES NFR BLD AUTO: 21.7 %
MAGNESIUM SERPL-MCNC: 1.86 MG/DL (ref 1.6–2.4)
MCH RBC QN AUTO: 30.2 PG (ref 26–34)
MCHC RBC AUTO-ENTMCNC: 31.6 G/DL (ref 32–36)
MCV RBC AUTO: 96 FL (ref 80–100)
MONOCYTES # BLD AUTO: 0.41 X10*3/UL (ref 0.05–0.8)
MONOCYTES NFR BLD AUTO: 6.1 %
NEUTROPHILS # BLD AUTO: 4.51 X10*3/UL (ref 1.6–5.5)
NEUTROPHILS NFR BLD AUTO: 67 %
NRBC BLD-RTO: 0 /100 WBCS (ref 0–0)
PATH REVIEW-CBC DIFFERENTIAL: NORMAL
PLATELET # BLD AUTO: 89 X10*3/UL (ref 150–450)
POTASSIUM SERPL-SCNC: 3.8 MMOL/L (ref 3.5–5.3)
PROT SERPL-MCNC: 5.3 G/DL (ref 6.4–8.2)
RBC # BLD AUTO: 3.24 X10*6/UL (ref 4–5.2)
SODIUM SERPL-SCNC: 136 MMOL/L (ref 136–145)
WBC # BLD AUTO: 6.7 X10*3/UL (ref 4.4–11.3)

## 2024-10-07 PROCEDURE — 97162 PT EVAL MOD COMPLEX 30 MIN: CPT | Mod: GP

## 2024-10-07 PROCEDURE — 36415 COLL VENOUS BLD VENIPUNCTURE: CPT | Performed by: NURSE PRACTITIONER

## 2024-10-07 PROCEDURE — 83735 ASSAY OF MAGNESIUM: CPT

## 2024-10-07 PROCEDURE — 76705 ECHO EXAM OF ABDOMEN: CPT

## 2024-10-07 PROCEDURE — 2500000004 HC RX 250 GENERAL PHARMACY W/ HCPCS (ALT 636 FOR OP/ED)

## 2024-10-07 PROCEDURE — 1200000002 HC GENERAL ROOM WITH TELEMETRY DAILY

## 2024-10-07 PROCEDURE — 36415 COLL VENOUS BLD VENIPUNCTURE: CPT

## 2024-10-07 PROCEDURE — 2500000001 HC RX 250 WO HCPCS SELF ADMINISTERED DRUGS (ALT 637 FOR MEDICARE OP)

## 2024-10-07 PROCEDURE — 99291 CRITICAL CARE FIRST HOUR: CPT | Performed by: INTERNAL MEDICINE

## 2024-10-07 PROCEDURE — 84145 PROCALCITONIN (PCT): CPT | Mod: STJLAB | Performed by: NURSE PRACTITIONER

## 2024-10-07 PROCEDURE — 80053 COMPREHEN METABOLIC PANEL: CPT

## 2024-10-07 PROCEDURE — 2500000002 HC RX 250 W HCPCS SELF ADMINISTERED DRUGS (ALT 637 FOR MEDICARE OP, ALT 636 FOR OP/ED)

## 2024-10-07 PROCEDURE — 2500000001 HC RX 250 WO HCPCS SELF ADMINISTERED DRUGS (ALT 637 FOR MEDICARE OP): Performed by: INTERNAL MEDICINE

## 2024-10-07 PROCEDURE — 76705 ECHO EXAM OF ABDOMEN: CPT | Performed by: RADIOLOGY

## 2024-10-07 PROCEDURE — 85025 COMPLETE CBC W/AUTO DIFF WBC: CPT

## 2024-10-07 RX ORDER — SERTRALINE HYDROCHLORIDE 25 MG/1
25 TABLET, FILM COATED ORAL DAILY
Status: DISCONTINUED | OUTPATIENT
Start: 2024-10-08 | End: 2024-10-08 | Stop reason: HOSPADM

## 2024-10-07 RX ORDER — POTASSIUM CHLORIDE 1.5 G/1.58G
20 POWDER, FOR SOLUTION ORAL ONCE
Status: DISCONTINUED | OUTPATIENT
Start: 2024-10-07 | End: 2024-10-08 | Stop reason: HOSPADM

## 2024-10-07 ASSESSMENT — PAIN - FUNCTIONAL ASSESSMENT
PAIN_FUNCTIONAL_ASSESSMENT: 0-10

## 2024-10-07 ASSESSMENT — COGNITIVE AND FUNCTIONAL STATUS - GENERAL
WALKING IN HOSPITAL ROOM: A LITTLE
MOBILITY SCORE: 20
DAILY ACTIVITIY SCORE: 20
CLIMB 3 TO 5 STEPS WITH RAILING: TOTAL
TOILETING: A LITTLE
DRESSING REGULAR LOWER BODY CLOTHING: A LITTLE
WALKING IN HOSPITAL ROOM: A LITTLE
STANDING UP FROM CHAIR USING ARMS: A LITTLE
STANDING UP FROM CHAIR USING ARMS: A LITTLE
MOVING FROM LYING ON BACK TO SITTING ON SIDE OF FLAT BED WITH BEDRAILS: A LITTLE
MOBILITY SCORE: 16
MOVING TO AND FROM BED TO CHAIR: A LITTLE
DRESSING REGULAR UPPER BODY CLOTHING: A LITTLE
CLIMB 3 TO 5 STEPS WITH RAILING: A LITTLE
HELP NEEDED FOR BATHING: A LITTLE
TURNING FROM BACK TO SIDE WHILE IN FLAT BAD: A LITTLE
MOVING TO AND FROM BED TO CHAIR: A LITTLE

## 2024-10-07 ASSESSMENT — PAIN SCALES - GENERAL
PAINLEVEL_OUTOF10: 0 - NO PAIN
PAINLEVEL_OUTOF10: 3
PAINLEVEL_OUTOF10: 3
PAINLEVEL_OUTOF10: 0 - NO PAIN

## 2024-10-07 ASSESSMENT — PAIN DESCRIPTION - DESCRIPTORS: DESCRIPTORS: ACHING

## 2024-10-07 NOTE — PROGRESS NOTES
Spiritual Care Visit    Clinical Encounter Type  Visited With: Patient  Routine Visit: Introduction  Continue Visiting: No                                            Taxonomy  Intended Effects: Promote sense of peace, Preserve dignity and respect, Meaning-making  Methods: Offer spiritual/Yazidism support  Interventions: Share words of hope and inspiration, [place]    Patient shared all about her erum.   encouraged her in her erum and prayed and praised God with her.   was a supportive presence.

## 2024-10-07 NOTE — PROGRESS NOTES
Physical Therapy    Physical Therapy Evaluation    Patient Name: Namita Louis  MRN: [id number]  Department: [facility] ICU  Room: [facility] [id number]-A  Today's Date: 10/7/2024   Time Calculation  Start Time: 0912  Stop Time: 0932  Time Calculation (min): 20 min    Assessment/Plan   PT Assessment  PT Assessment Results: Decreased strength, Decreased endurance, Impaired balance, Decreased mobility  Rehab Prognosis: Good  Medical Staff Made Aware: Yes  End of Session Communication: Bedside nurse  Assessment Comment: Pt's impairments include generalized weakness, impaired balance and decreased activity tolerance. Pt's functional limitations include bed mobility, transfers, gait and elevations. Pt would benefit from continued acute care PT during hospital LOS and upon discharge at a low level intensity. Recommend FWW.  End of Session Patient Position: Alarm on, Up in chair  IP OR SWING BED PT PLAN  Inpatient or Swing Bed: Inpatient  PT Plan  Treatment/Interventions: Bed mobility, Transfer training, Gait training, Balance training, Stair training, Neuromuscular re-education, Strengthening, Endurance training, Range of motion, Therapeutic exercise, Therapeutic activity, Home exercise program, Postural re-education, Positioning  PT Plan: Ongoing PT  PT Frequency: 4 times per week  PT Discharge Recommendations: Low intensity level of continued care  Equipment Recommended upon Discharge: Wheeled walker  PT Recommended Transfer Status: Contact guard, Assistive device  PT - OK to Discharge: Yes (Pt ok to discharge from acute care PT to next level of care once cleared by medical team.)      Subjective   General Visit Information:  General  Reason for Referral: Pt adm to ICU with pneumonia, colitis, nause and vomiting, hypotension, likely viral gastroenteritis, sepsis.  Referred By: Minnie Arrington MD  Past Medical History Relevant to Rehab: History reviewed. No pertinent past medical history.    Family/Caregiver Present: No  Prior to Session  Communication: Bedside nurse  Patient Position Received: Bed, 3 rail up, Alarm on  General Comment: Pt is agreeable to PT assessment.  Home Living:  Home Living  Type of Home: [place]  Lives With: Alone  Home Adaptive Equipment: Cane (quad cane)  Home Layout: One level (1 BHANU)  Bathroom Shower/Tub: Walk-in shower  Bathroom Equipment: Grab bars in shower, Built-in shower seat  Prior Level of Function:  Prior Function Per Pt/Caregiver Report  Level of [place]: Independent with ADLs and functional transfers, Independent with homemaking with ambulation  Prior Function Comments: Pt reports she had recent fall in early September, no injury. Pt has since used a quad cane but has continued to feel unsure walking. Previously pt was IND without AD for gait, ADLs, IADLs. Driving.  Precautions:  Precautions  Medical Precautions: Fall precautions    Objective   Pain:  Pain Assessment  Pain Assessment: 0-10  0-10 (Numeric) Pain Score: 0 - No pain  Cognition:  Cognition  Overall Cognitive Status: Within Functional Limits  Orientation Level: Oriented X4    General Assessments:  Sensation  Light Touch: No apparent deficits    Static Sitting Balance  Static Sitting-Comment/Number of Minutes: SBA; midline posture  Dynamic Sitting Balance  Dynamic Sitting-Comments: SBA to scoot    Static Standing Balance  Static Standing-Comment/Number of Minutes: CGA with FWW  Dynamic Standing Balance  Dynamic Standing-Comments: CGA with FWW to amb bed>chair  Functional Assessments:  Bed Mobility  Bed Mobility: Yes  Bed Mobility 1  Bed Mobility 1: Supine to sitting  Bed Mobility Comments 1: SBA with HOB elevated; denies dizziness or lightheadedness.    Transfers  Transfer: Yes  Transfer 1  Technique 1: Sit to stand, Stand to sit  Transfer Device 1: Walker  Trials/Comments 1: CGA with FWW. Cues for safety with device. Pt denies dizziness in standing.  Transfers 2  Transfer From 2: Bed to  Transfer to 2: Chair with arms  Transfer Device 2:  Walker  Trials/Comments 2: CGA with FWW to amb 1x3' bed to chair. Cues for safety with device. Assist with line management.       Extremity/Trunk Assessments:  RLE   RLE : Within Functional Limits  LLE   LLE : Within Functional Limits  Outcome Measures:  [facility] Basic Mobility  Turning from your back to your side while in a flat bed without using bedrails: A little  Moving from lying on your back to sitting on the side of a flat bed without using bedrails: A little  Moving to and from bed to chair (including a wheelchair): A little  Standing up from a chair using your arms (e.g. wheelchair or bedside chair): A little  To walk in hospital room: A little  Climbing 3-5 steps with railing: Total  Basic Mobility - Total Score: 16    Encounter Problems       Encounter Problems (Active)       PT Problem       Bed mobility (Progressing)       Start:  10/07/24    Expected End:  10/21/24       Pt will perform supine<>sit with HOB flat, CLARA.           Transfers (Progressing)       Start:  10/07/24    Expected End:  10/21/24       Pt will perform all transfers with LRAD, CLARA.            Gait (Progressing)       Start:  10/07/24    Expected End:  10/21/24       Pt will amb 150' with LRAD, CLARA with reciprocal gait, upright posture and improved activity tolerance as demonstrated by vitals.           Balance (Progressing)       Start:  10/07/24    Expected End:  10/21/24       Pt will tolerate standing x2 min without UE support, SBAx1.             Pain - Adult              Education Documentation  Mobility Training, taught by Kalli Leon, PT at 10/7/2024  1:30 PM.  Learner: Patient  Readiness: Acceptance  Method: Explanation  Response: Verbalizes Understanding, Demonstrated Understanding    Education Comments  No comments found.

## 2024-10-07 NOTE — CARE PLAN
The patient's goals for the shift include      The clinical goals for the shift include Patient will remain hemodynamically stable through the end of this shift    Over the shift, the patient did not make progress toward the following goals. Barriers to progression include acuteness of illness. Recommendations to address these barriers include continue plan of care.

## 2024-10-07 NOTE — PROGRESS NOTES
Critical Care Daily Progress        Subjective   Patient is a 89 y.o. female admitted on 10/5/2024  3:20 PM with the following indication(s) for ICU care sepsis 2/2 pneumonia. PMH significant for paroxysmal atrial fibrillation (s/p PVI in 2016), CVA 03/2021, Hx multiple TIA, AVB s/p pacemaker, asthma, DLD, bilateral pseudophakia, MDD.      Overnight Events: No over night even. VS: Afebrile, /59, HR 60, MAP 88, 95 % on 2 L NC. Lungs -there is bilateral breath sound, clear to auscultation.    Complaints: has none. A&Ox4. Denies headache, chest pain, shortness of breath, nausea, or abdominal pain.      Labs:  Blood cultures are negative    CBC: H/H 9.8/31.0 down from 10.9/34.3, WBC is normal. Plt is 89, down from 100.    CMP: Normal renal function. No electrolyte abnormality    I/O - 4410/1150 with net +ve 3200 cc    Abdominal Ultrasound - Mildly contracted gallbladder without findings of acute cholecystitis or cholelithiasis otherwise.     Summary of management plan    Switching Citalopram to sertraline due to Sotalol contraindicated for Qtc prolongation, okayed by her PCP, Dr. Arrington. [10/07/24].    Patient has been downgraded to the service of Dr. Arrington.      Scheduled medications  atorvastatin, 10 mg, oral, Nightly  cefTRIAXone, 2 g, intravenous, q24h  cholecalciferol, 2,000 Units, oral, Daily  magnesium oxide, 400 mg, oral, Daily  midodrine, 10 mg, oral, BID  pantoprazole, 40 mg, oral, Daily before breakfast  polyethylene glycol, 17 g, oral, Daily  potassium chloride, 20 mEq, oral, Once  rivaroxaban, 15 mg, oral, Daily  [START ON 10/8/2024] sertraline, 25 mg, oral, Daily  sotalol, 40 mg, oral, Daily      Continuous medications     PRN medications  PRN medications: acetaminophen, ondansetron, oxygen, potassium chloride CR **OR** potassium chloride, potassium chloride CR **OR** potassium chloride     Objective   Vitals:  Temp  Min: 36.6 °C (97.9 °F)  Max: 37.2 °C (99 °F)  Pulse  Min: 58  Max: 80  BP  Min:  81/47  Max: 125/67  Resp  Min: 15  Max: 33  SpO2  Min: 90 %  Max: 99 %    Physical Exam:   Physical Exam   Physical Exam  Vitals and nursing note reviewed.   Constitutional:       General: She is not in acute distress.     Appearance: Normal appearance. She is normal weight. She is not ill-appearing, toxic-appearing or diaphoretic.   HENT:      Head: Normocephalic and atraumatic.      Right Ear: External ear normal.      Left Ear: External ear normal.      Nose: No congestion or rhinorrhea.      Mouth/Throat:      Pharynx: No oropharyngeal exudate or posterior oropharyngeal erythema.   Eyes:      General:         Right eye: No discharge.         Left eye: No discharge.      Extraocular Movements: Extraocular movements intact.      Conjunctiva/sclera: Conjunctivae normal.      Pupils: Pupils are equal, round, and reactive to light.   Cardiovascular:      Rate and Rhythm: Normal rate and regular rhythm.      Pulses: Normal pulses.      Heart sounds: Normal heart sounds. No murmur heard.     No friction rub. No gallop.   Pulmonary:      Effort: Pulmonary effort is normal. No respiratory distress.      Breath sounds: Normal breath sounds. No stridor. No wheezing or rhonchi.   Abdominal:      General: Abdomen is flat. There is no distension.      Palpations: Abdomen is soft.      Tenderness: There is no guarding or rebound.   Musculoskeletal:         General: No swelling, deformity or signs of injury. Normal range of motion.      Cervical back: Normal range of motion. No rigidity.   Skin:     General: Skin is warm.      Capillary Refill: Capillary refill takes less than 2 seconds.      Coloration: Skin is not pale.      Findings: No bruising or lesion.   Neurological:      General: No focal deficit present.      Mental Status: She is alert and oriented to person, place, and time. Mental status is at baseline.      Sensory: No sensory deficit.      Motor: No weakness.   Psychiatric:         Mood and Affect: Mood normal.          Behavior: Behavior normal.         Thought Content: Thought content normal.     Results for orders placed or performed during the hospital encounter of 10/05/24 (from the past 24 hour(s))   CBC and Auto Differential   Result Value Ref Range    WBC 6.7 4.4 - 11.3 x10*3/uL    nRBC 0.0 0.0 - 0.0 /100 WBCs    RBC 3.24 (L) 4.00 - 5.20 x10*6/uL    Hemoglobin 9.8 (L) 12.0 - 16.0 g/dL    Hematocrit 31.0 (L) 36.0 - 46.0 %    MCV 96 80 - 100 fL    MCH 30.2 26.0 - 34.0 pg    MCHC 31.6 (L) 32.0 - 36.0 g/dL    RDW 13.2 11.5 - 14.5 %    Platelets 89 (L) 150 - 450 x10*3/uL    Neutrophils % 67.0 40.0 - 80.0 %    Immature Granulocytes %, Automated 0.3 0.0 - 0.9 %    Lymphocytes % 21.7 13.0 - 44.0 %    Monocytes % 6.1 2.0 - 10.0 %    Eosinophils % 4.3 0.0 - 6.0 %    Basophils % 0.6 0.0 - 2.0 %    Neutrophils Absolute 4.51 1.60 - 5.50 x10*3/uL    Immature Granulocytes Absolute, Automated 0.02 0.00 - 0.50 x10*3/uL    Lymphocytes Absolute 1.46 0.80 - 3.00 x10*3/uL    Monocytes Absolute 0.41 0.05 - 0.80 x10*3/uL    Eosinophils Absolute 0.29 0.00 - 0.40 x10*3/uL    Basophils Absolute 0.04 0.00 - 0.10 x10*3/uL   Comprehensive metabolic panel   Result Value Ref Range    Glucose 88 74 - 99 mg/dL    Sodium 136 136 - 145 mmol/L    Potassium 3.8 3.5 - 5.3 mmol/L    Chloride 103 98 - 107 mmol/L    Bicarbonate 29 21 - 32 mmol/L    Anion Gap 8 (L) 10 - 20 mmol/L    Urea Nitrogen 15 6 - 23 mg/dL    Creatinine 0.84 0.50 - 1.05 mg/dL    eGFR 67 >60 mL/min/1.73m*2    Calcium 7.8 (L) 8.6 - 10.3 mg/dL    Albumin 2.9 (L) 3.4 - 5.0 g/dL    Alkaline Phosphatase 59 33 - 136 U/L    Total Protein 5.3 (L) 6.4 - 8.2 g/dL    AST 32 9 - 39 U/L    Bilirubin, Total 0.3 0.0 - 1.2 mg/dL    ALT 34 7 - 45 U/L   Magnesium   Result Value Ref Range    Magnesium 1.86 1.60 - 2.40 mg/dL        US right upper quadrant    Result Date: 10/7/2024  Interpreted By:  Luis Sy, STUDY: US RIGHT UPPER QUADRANT;  10/7/2024 9:10 am   INDICATION: Signs/Symptoms:distended GB on  CT.     COMPARISON: None.   ACCESSION NUMBER(S): [id number]   ORDERING CLINICIAN: DARVIN PASCUAL   TECHNIQUE: Multiple images of the right upper quadrant were obtained.   FINDINGS: LIVER: The liver measures 12.8 cm . No focal abnormality identified.   GALLBLADDER: The gallbladder is nondistended, and demonstrates no evidence of gallstones, wall thickening or surrounding fluid. The gallbladder wall thickness is 0.3 cm.   BILE DUCTS: No evidence of intra or extrahepatic biliary dilatation is identified; the common bile duct measures 0.5 cm.   PANCREAS: Mostly obscured by bowel gas.   RIGHT KIDNEY: The right kidney measures 10.2 cm in length. The renal cortical echogenicity and thickness are within normal limit.  No hydronephrosis or renal calculi are seen.       Mildly contracted gallbladder without findings of acute cholecystitis or cholelithiasis otherwise.   MACRO: None   Signed by: Luis Sy 10/7/2024 10:16 AM Dictation workstation:   [id number]    ECG 12 lead    Result Date: 10/7/2024  Electronic ventricular pacemaker When compared with ECG of 29-SEP-2022 14:05, Vent. rate has increased BY  25 BPM    ECG 12 lead    Result Date: 10/7/2024  Electronic ventricular pacemaker When compared with ECG of 05-OCT-2024 16:21, (unconfirmed) Vent. rate has decreased BY  12 BPM    CT angio chest for pulmonary embolism    Result Date: 10/5/2024  STUDY: CT Angiogram of the Chest, CT Abdomen and Pelvis with IV Contrast; 10/5/2024 5:12 PM INDICATION: Evaluate for pulmonary embolism or other infectious process. Shortness of breath.  Hypoxia.  Abdominal pain with nausea and emesis. COMPARISON: XR chest same day 4:16 PM.  ACCESSION NUMBER(S): [id number], [id number] ORDERING CLINICIAN: SHANNEN CUI TECHNIQUE:  CTA of the chest was performed following rapid injection of intravenous contrast.  Images are reviewed and processed at a workstation according to the CT angiogram protocol with 3-D and/or MIP post processing  imaging generated.  CT of the abdomen and pelvis was performed with intravenous contrast.  Omnipaque 350 75 mL was administered intravenously. Automated mA/kV exposure control was utilized and patient examination was performed in strict accordance with principles of ALARA. FINDINGS:  CTA CHEST: Pulmonary arteries are adequately opacified without acute or chronic filling defects.  The thoracic aorta is normal in course and caliber without dissection or aneurysm. The heart is normal in size without pericardial effusion.  Thoracic lymph nodes are not enlarged.  Left subclavian pacer device is demonstrated. There is no pleural effusion, pleural thickening, or pneumothorax. The airways are patent. There is airspace opacity left lower lobe concerning for pneumonia.. There is mild atelectasis of the left lower lobe and lingular lobe. There are small tree-in-bud nodular densities in the right middle lobe suggesting acute bronchiolitis.  There is atelectasis in the medial aspect of the right middle lobe. ABDOMEN:  LIVER: No hepatomegaly.  Smooth surface contour.  Is fatty liver morphology..  There is mild periportal edema.  BILE DUCTS: No intrahepatic or extrahepatic biliary ductal dilatation.  GALLBLADDER: The gallbladder is prominent.  STOMACH: Mild wall thickening of the stomach concerning for gastritis.  PANCREAS: No masses or ductal dilatation.  SPLEEN: No splenomegaly or focal splenic lesion.  Calcified granuloma redemonstrated within the spleen.  ADRENAL GLANDS: No thickening or nodules.  KIDNEYS AND URETERS: Kidneys are normal in size and location.  No renal or ureteral calculi.  Small hypodensities in the left kidney suggest renal cysts.  PELVIS:  BLADDER: No abnormalities identified.  REPRODUCTIVE ORGANS: No abnormalities identified.  BOWEL: There is significant distal colonic and rectal stool.  There is bowel wall thickening in the colon in the descending and splenic flexure.  VESSELS: No abnormalities identified.   Abdominal aorta is normal in caliber.  PERITONEUM/RETROPERITONEUM/LYMPH NODES: No free fluid.  No pneumoperitoneum. No lymphadenopathy.  ABDOMINAL WALL: No abnormalities identified. SOFT TISSUES: No abnormalities identified.  BONES: No acute fracture or aggressive osseous lesion.  Thre is rightward convexity of the lumbar spine.  There is disc space narrowing and endplate degenerative changes of the lumbar spine.    Chest:  No pulmonary artery emboli. Acute bronchiolitis of right middle lobe.  Airspace opacity left lower lobe concerning for pneumonia. Multifocal mild atelectasis. Abdomen:  Prominent gallbladder.  Recommend ultrasound for further evaluation.  Mild periportal edema. Mild wall thickening of the stomach.  Please correlate for gastritis. Pelvis:  Wall thickening of the splenic flexure and descending colon suggesting colitis.  Significant distal colonic and rectal stool. Signed by Faheem Kumar, DO    CT abdomen pelvis w IV contrast    Result Date: 10/5/2024  STUDY: CT Angiogram of the Chest, CT Abdomen and Pelvis with IV Contrast; 10/5/2024 5:12 PM INDICATION: Evaluate for pulmonary embolism or other infectious process. Shortness of breath.  Hypoxia.  Abdominal pain with nausea and emesis. COMPARISON: XR chest same day 4:16 PM.  ACCESSION NUMBER(S): [id number], [id number] ORDERING CLINICIAN: SHANNEN CUI TECHNIQUE:  CTA of the chest was performed following rapid injection of intravenous contrast.  Images are reviewed and processed at a workstation according to the CT angiogram protocol with 3-D and/or MIP post processing imaging generated.  CT of the abdomen and pelvis was performed with intravenous contrast.  Omnipaque 350 75 mL was administered intravenously. Automated mA/kV exposure control was utilized and patient examination was performed in strict accordance with principles of ALARA. FINDINGS:  CTA CHEST: Pulmonary arteries are adequately opacified without acute or chronic filling defects.   The thoracic aorta is normal in course and caliber without dissection or aneurysm. The heart is normal in size without pericardial effusion.  Thoracic lymph nodes are not enlarged.  Left subclavian pacer device is demonstrated. There is no pleural effusion, pleural thickening, or pneumothorax. The airways are patent. There is airspace opacity left lower lobe concerning for pneumonia.. There is mild atelectasis of the left lower lobe and lingular lobe. There are small tree-in-bud nodular densities in the right middle lobe suggesting acute bronchiolitis.  There is atelectasis in the medial aspect of the right middle lobe. ABDOMEN:  LIVER: No hepatomegaly.  Smooth surface contour.  Is fatty liver morphology..  There is mild periportal edema.  BILE DUCTS: No intrahepatic or extrahepatic biliary ductal dilatation.  GALLBLADDER: The gallbladder is prominent.  STOMACH: Mild wall thickening of the stomach concerning for gastritis.  PANCREAS: No masses or ductal dilatation.  SPLEEN: No splenomegaly or focal splenic lesion.  Calcified granuloma redemonstrated within the spleen.  ADRENAL GLANDS: No thickening or nodules.  KIDNEYS AND URETERS: Kidneys are normal in size and location.  No renal or ureteral calculi.  Small hypodensities in the left kidney suggest renal cysts.  PELVIS:  BLADDER: No abnormalities identified.  REPRODUCTIVE ORGANS: No abnormalities identified.  BOWEL: There is significant distal colonic and rectal stool.  There is bowel wall thickening in the colon in the descending and splenic flexure.  VESSELS: No abnormalities identified.  Abdominal aorta is normal in caliber.  PERITONEUM/RETROPERITONEUM/LYMPH NODES: No free fluid.  No pneumoperitoneum. No lymphadenopathy.  ABDOMINAL WALL: No abnormalities identified. SOFT TISSUES: No abnormalities identified.  BONES: No acute fracture or aggressive osseous lesion.  Thre is rightward convexity of the lumbar spine.  There is disc space narrowing and endplate  degenerative changes of the lumbar spine.    Chest:  No pulmonary artery emboli. Acute bronchiolitis of right middle lobe.  Airspace opacity left lower lobe concerning for pneumonia. Multifocal mild atelectasis. Abdomen:  Prominent gallbladder.  Recommend ultrasound for further evaluation.  Mild periportal edema. Mild wall thickening of the stomach.  Please correlate for gastritis. Pelvis:  Wall thickening of the splenic flexure and descending colon suggesting colitis.  Significant distal colonic and rectal stool. Signed by Faheem Kumar, DO    CT cervical spine wo IV contrast    Result Date: 10/5/2024  Interpreted By:  Demetrius Barlow, STUDY: CT of the cervical spine  without contrast dated  10/5/2024.   INDICATION: Pain; trauma.   COMPARISON: None.   ACCESSION NUMBER(S): [id number]   ORDERING CLINICIAN: SHANNEN CUI   TECHNIQUE: Axial CT of the cervical spine was performed  without intravenous contrast.   Sagittal and coronal two-dimensional reformats were obtained.   FINDINGS: OSSEOUS STRUCTURES:   The bones are demineralized. The cervical spine is seen to  C7/T1. Vertebral body height and alignment are maintained.  The dens and lateral masses are intact.  No fracture or dislocation is evident. There is mild-to-moderate multilevel discogenic and facet degenerative change of the visualized spine. Mild neural foraminal narrowing is seen at multiple levels in the midcervical spine. Posterior disc osteophyte complexes cause impression of the ventral thecal sac at multiple levels in the mid cervical spine without edgar central canal narrowing evident as seen on noncontrast CT.   SOFT TISSUES:   No prevertebral soft tissue swelling is evident.  Mucosal thickening is seen in the bases of the maxillary sinuses. Calcified atheromatous disease is seen in the visualized arterial tree. Scarring and/or atelectasis is seen at the lung apices. There is partial visualization of pulse generator leads in the left subclavian  region..       Degenerative change of the cervical spine without osseous injury evident.   Signed by: Demetrius Barlow 10/5/2024 6:17 PM Dictation workstation:   [id number]    CT head wo IV contrast    Result Date: 10/5/2024  Interpreted By:  Demetrius Barlow, STUDY: CT of the brain without contrast dated  10/5/2024.   INDICATION: Signs/Symptoms:nausea/vomiting, dizziness   COMPARISON: CT dated 03/29/2011   ACCESSION NUMBER(S): [id number]   ORDERING CLINICIAN: SHANNEN CUI   TECHNIQUE: Axial non-contrast CT of the brain was performed. Sagittal and coronal 2D reformats were obtained.   FINDINGS: BRAIN PARENCHYMA:   No acute intracranial hemorrhage or infarction is evident.  There are deep and periventricular white matter hypodensities compatible with chronic ischemic demyelination.   VENTRICLES AND EXTRA-AXIAL SPACES:   No hydrocephalus or midline shift is evident.  There is mild cerebral volume loss with ex vacuo dilation of the ventricles.   INTRACRANIAL VESSELS:   Calcified atheromatous disease is seen of the visualized carotid and vertebrobasilar arterial trees.   SINUSES AND MASTOIDS:   Mucosal thickening is seen in the bases of the maxillary sinuses.   OSSEOUS STRUCTURES:   No osseous injury is evident.   SOFT TISSUES:   Visualized associated soft tissues are grossly unremarkable.       No acute intracranial hemorrhage or large territory infarction is evident.   Signed by: Demetrius Barlow 10/5/2024 6:15 PM Dictation workstation:   [id number]    XR chest 1 view    Result Date: 10/5/2024  STUDY: Chest Radiograph;  10/05/2024 INDICATION: Hypoxia. COMPARISON: XR chest 03/29/2021. ACCESSION NUMBER(S): [id number] ORDERING CLINICIAN: SHANNEN CUI TECHNIQUE:  Frontal chest was obtained at 16:36 hours. FINDINGS: CARDIOMEDIASTINAL SILHOUETTE: Cardiomediastinal silhouette is normal in size and configuration. Left subclavian pacer device is demonstrated.  LUNGS: Lungs are clear.  There is no pneumothorax or pleural  effusion.  ABDOMEN: No remarkable upper abdominal findings.  BONES: No acute osseous changes.    No acute cardiopulmonary disease. Signed by Faheem Kumar, DO        Assessment/Plan   Overall Assessment:  Patient is an 89-year-old female with PMH significant for paroxysmal atrial fibrillation (s/p PVI in 2016), CVA 03/2021, Hx multiple TIA, AVB s/p pacemaker, asthma, DLD, bilateral pseudophakia, MDD.  Patient presented to [facility] for chief complaint of approximately 6 hours of fevers, chills, rigors, vomiting.  She stated this all started suddenly after eating lunch, and that she felt fine this morning with no symptoms whatsoever.  In the emergency department patient developed soft blood pressures with 80s over 40s, she will be admitted to the ICU preemptively for sepsis with hypovolemia without current septic/hypovolemic shock in case of the need of vasopressors.     Neuro:   -History: MDD, Hx CVA 03/2021, Hx multiple TIA  -Home meds: Citalopram swithced to Sertraline due to Sotalol contraindicated for Qtc prolongation, okayed by her PCP, Dr. Arrington. [10/07/24].  -GCS: E 4 V 5 M 6  -Pain: Tylenol 650 mg every 6 hours as needed  -Sedation: None at this time  -[facility] ICU                      Cardiac:   #Hypovolemia  #Hypotension, without hypovolemic shock  #Sepsis without septic shock  - History: Paroxysmal atrial fibrillation s/p PVI 2016, AVB s/p pacemaker, DLD  - Goals: [MAP> 65, HR ]  - Home meds: Lipitor 10 mg, sotalol 80 mg, Xarelto 15 mg  - Last echo: 03/21: LVEF 55-60%, impaired relaxation pattern of LV DF, mild AVR, RVSP WNL, small PFO appreciated  - Pressors: None at this time     Pulmonary:   #Hypoxia  #Community-acquired pneumonia (CAP)  -History: Asthma  -Home meds: None  -Continuous pulse oximetry   -Currently on 2 L via NC, O2 PRN to maintain SpO2 > 94%, wean as tolerated      Gastrointestinal: #Likely viral gastroenteritis  Results from last 7 days   Lab Units 10/06/24  1107 10/06/24  3921  10/05/24  1554   INR   --   --  1.5*   APTT seconds  --   --  30   ALK PHOS U/L  --  56 69   AST U/L  --  47* 36   ALT U/L  --  37 31   LIPASE U/L 19  --   --      -History: None  -Home meds: None  - Diet: Regular  - Supplementation: None  - Prophylaxis: protonix  - Bowel regimen: miralax  - Last BM: Last BM Date: 10/06/24  -Imaging: Abdominal Ultrasound - Mildly contracted gallbladder without findings of acute cholecystitis or cholelithiasis otherwise.     Renal:   Results from last 7 days   Lab Units 10/06/24  0325 10/05/24  1554   SODIUM mmol/L 138 140   POTASSIUM mmol/L 4.2 4.1   MAGNESIUM mg/dL 2.46* 1.58*   PHOSPHORUS mg/dL 4.2  --    BUN mg/dL 16 18   CREATININE mg/dL 1.02 0.98       Net IO Since Admission: 3,260 mL [10/07/24 0726]  - Daily CMP,Mg,Phos  - [place] with strict I/O   - Fluids:  mL/hr  - Electrolytes: Replete per protocol, goal K>4 Phos >3 Mg >2    Endocrine:   Results from last 7 days   Lab Units 10/06/24  0325 10/05/24  1554   GLUCOSE mg/dL 133* 104*    -History: None  -Home meds: None  -sliding scale: None at this time we will continue to monitor blood glucose levels and add SSI as indicated  -BG < 180 goal    Hematology: #Pancytopenia  Results from last 7 days   Lab Units 10/06/24  1107 10/06/24  0325 10/05/24  1554   HEMOGLOBIN g/dL 10.9* 10.8* 11.3*   HEMATOCRIT % 34.3* 34.2* 34.8*   PLATELETS AUTO x10*3/uL 100* 96* 94*   - Daily CBC, coags  -History: A-fib on Xarelto  -Home meds: Xarelto  - DVT Prophylaxis: Home Xarelto, SCDs  -Transfuse if hemoglobin less than 7    Infectious Disease:   #Sepsis (SIRS 4/4) @adm  #Likely viral gastroenteritis  #Community-acquired pneumonia (CAP)  Results from last 7 days   Lab Units 10/06/24  1107 10/06/24  0325 10/05/24  1554   WBC AUTO x10*3/uL 6.3 6.7 4.1*     Temp (24hrs), Av °C (98.6 °F), Min:36.6 °C (97.9 °F), Max:37.2 °C (99 °F)     -MRSA, COVID, flu PCR's all negative  -Abx: Rocephin  -Cultures: Blood cultures X2, sputum culture  ordered,    Musculoskeletal:   - PT to see   - OT to see     LDA:  External Urinary Catheter (Active)   Placement Date/Time: 10/05/24 2130     Number of days: 0         CODE STATUS: DNR and No Intubation    Disposition: To remain in the ICU until medically cleared to leave the unit.    RESTRAINTS: type: None    ABCDEF Checklist  Analgesia: Spontaneous awakening trial to be pursued if clinically appropriate. RASS goal reviewed  Breathing: Spontaneous breathing trial to be pursued if clinically appropriate. Mechanical power of assisted ventilation reviewed  Choice of analgesia/sedation: Analgesic and sedative agents adjusted per clinical context.   Delirium assessed by CAM, will avoid exacerbating factors  Early mobility and exercise: Physical and occupational therapy engaged  Family: Plan of care, overall trajectory of patient shared with family. Questions elicited and answered as appropriate.     Due to the high probability of life threatening clinical decompensation, the patient required critical care time evaluating and managing this patient.  Critical care time included obtaining a history, examining the patient, ordering and reviewing studies, discussing, developing, and implementing a management plan, evaluating the patient's response to treatment, and discussion with other care team providers. I saw and evaluated the patient myself.  Critical care time was performed exclusive of billable procedures.    Critical care time:       Case discussed with attending , Dr. Marin.  Jac Krueger MD     Cct is 30 mins

## 2024-10-08 ENCOUNTER — PHARMACY VISIT (OUTPATIENT)
Dept: PHARMACY | Facility: CLINIC | Age: 89
End: 2024-10-08
Payer: COMMERCIAL

## 2024-10-08 VITALS
WEIGHT: 139.55 LBS | TEMPERATURE: 97.3 F | BODY MASS INDEX: 24.73 KG/M2 | OXYGEN SATURATION: 92 % | DIASTOLIC BLOOD PRESSURE: 60 MMHG | RESPIRATION RATE: 16 BRPM | HEART RATE: 59 BPM | SYSTOLIC BLOOD PRESSURE: 130 MMHG | HEIGHT: 63 IN

## 2024-10-08 LAB
ANION GAP SERPL CALC-SCNC: 10 MMOL/L (ref 10–20)
BUN SERPL-MCNC: 12 MG/DL (ref 6–23)
CALCIUM SERPL-MCNC: 8.5 MG/DL (ref 8.6–10.3)
CHLORIDE SERPL-SCNC: 102 MMOL/L (ref 98–107)
CO2 SERPL-SCNC: 30 MMOL/L (ref 21–32)
CREAT SERPL-MCNC: 0.68 MG/DL (ref 0.5–1.05)
EGFRCR SERPLBLD CKD-EPI 2021: 84 ML/MIN/1.73M*2
ERYTHROCYTE [DISTWIDTH] IN BLOOD BY AUTOMATED COUNT: 13.1 % (ref 11.5–14.5)
GLUCOSE SERPL-MCNC: 81 MG/DL (ref 74–99)
HCT VFR BLD AUTO: 33.5 % (ref 36–46)
HGB BLD-MCNC: 10.7 G/DL (ref 12–16)
MAGNESIUM SERPL-MCNC: 1.85 MG/DL (ref 1.6–2.4)
MCH RBC QN AUTO: 30.4 PG (ref 26–34)
MCHC RBC AUTO-ENTMCNC: 31.9 G/DL (ref 32–36)
MCV RBC AUTO: 95 FL (ref 80–100)
NRBC BLD-RTO: 0 /100 WBCS (ref 0–0)
PLATELET # BLD AUTO: 121 X10*3/UL (ref 150–450)
POTASSIUM SERPL-SCNC: 4 MMOL/L (ref 3.5–5.3)
PROCALCITONIN SERPL-MCNC: 11.76 NG/ML
RBC # BLD AUTO: 3.52 X10*6/UL (ref 4–5.2)
SODIUM SERPL-SCNC: 138 MMOL/L (ref 136–145)
WBC # BLD AUTO: 5.7 X10*3/UL (ref 4.4–11.3)

## 2024-10-08 PROCEDURE — 80048 BASIC METABOLIC PNL TOTAL CA: CPT | Performed by: NURSE PRACTITIONER

## 2024-10-08 PROCEDURE — 36415 COLL VENOUS BLD VENIPUNCTURE: CPT | Performed by: NURSE PRACTITIONER

## 2024-10-08 PROCEDURE — 2500000001 HC RX 250 WO HCPCS SELF ADMINISTERED DRUGS (ALT 637 FOR MEDICARE OP): Performed by: NURSE PRACTITIONER

## 2024-10-08 PROCEDURE — 97116 GAIT TRAINING THERAPY: CPT | Mod: GP,CQ

## 2024-10-08 PROCEDURE — 2500000002 HC RX 250 W HCPCS SELF ADMINISTERED DRUGS (ALT 637 FOR MEDICARE OP, ALT 636 FOR OP/ED): Performed by: NURSE PRACTITIONER

## 2024-10-08 PROCEDURE — 85027 COMPLETE CBC AUTOMATED: CPT | Performed by: NURSE PRACTITIONER

## 2024-10-08 PROCEDURE — 2500000004 HC RX 250 GENERAL PHARMACY W/ HCPCS (ALT 636 FOR OP/ED): Performed by: NURSE PRACTITIONER

## 2024-10-08 PROCEDURE — 83735 ASSAY OF MAGNESIUM: CPT | Performed by: NURSE PRACTITIONER

## 2024-10-08 PROCEDURE — RXMED WILLOW AMBULATORY MEDICATION CHARGE

## 2024-10-08 PROCEDURE — 97530 THERAPEUTIC ACTIVITIES: CPT | Mod: GP,CQ

## 2024-10-08 RX ORDER — AMOXICILLIN AND CLAVULANATE POTASSIUM 875; 125 MG/1; MG/1
1 TABLET, FILM COATED ORAL 2 TIMES DAILY
Qty: 20 TABLET | Refills: 0 | Status: SHIPPED | OUTPATIENT
Start: 2024-10-08 | End: 2024-10-18

## 2024-10-08 RX ORDER — SERTRALINE HYDROCHLORIDE 25 MG/1
25 TABLET, FILM COATED ORAL DAILY
Qty: 30 TABLET | Refills: 0 | Status: SHIPPED | OUTPATIENT
Start: 2024-10-08 | End: 2024-11-07

## 2024-10-08 RX ORDER — LANOLIN ALCOHOL/MO/W.PET/CERES
800 CREAM (GRAM) TOPICAL ONCE
Status: COMPLETED | OUTPATIENT
Start: 2024-10-08 | End: 2024-10-08

## 2024-10-08 ASSESSMENT — ACTIVITIES OF DAILY LIVING (ADL)
LACK_OF_TRANSPORTATION: NO
EFFECT OF PAIN ON DAILY ACTIVITIES: .

## 2024-10-08 ASSESSMENT — COGNITIVE AND FUNCTIONAL STATUS - GENERAL
HELP NEEDED FOR BATHING: A LITTLE
WALKING IN HOSPITAL ROOM: A LITTLE
DAILY ACTIVITIY SCORE: 20
MOVING FROM LYING ON BACK TO SITTING ON SIDE OF FLAT BED WITH BEDRAILS: A LITTLE
MOVING TO AND FROM BED TO CHAIR: A LITTLE
STANDING UP FROM CHAIR USING ARMS: A LITTLE
STANDING UP FROM CHAIR USING ARMS: A LITTLE
CLIMB 3 TO 5 STEPS WITH RAILING: A LITTLE
TOILETING: A LITTLE
WALKING IN HOSPITAL ROOM: A LITTLE
MOVING TO AND FROM BED TO CHAIR: A LITTLE
TURNING FROM BACK TO SIDE WHILE IN FLAT BAD: A LITTLE
CLIMB 3 TO 5 STEPS WITH RAILING: A LOT
MOBILITY SCORE: 20
DRESSING REGULAR UPPER BODY CLOTHING: A LITTLE
MOBILITY SCORE: 17
DRESSING REGULAR LOWER BODY CLOTHING: A LITTLE

## 2024-10-08 ASSESSMENT — PAIN - FUNCTIONAL ASSESSMENT
PAIN_FUNCTIONAL_ASSESSMENT: 0-10

## 2024-10-08 ASSESSMENT — PAIN SCALES - GENERAL
PAINLEVEL_OUTOF10: 0 - NO PAIN

## 2024-10-08 NOTE — NURSING NOTE
Report called to Shannon on 3 so. Pt transferred to [id number]. Dr. Wooten called [facility] Rolly Campos NP to advise of patient transfer

## 2024-10-08 NOTE — PROGRESS NOTES
Namita Louis is a 89 y.o. female on day 3 of admission presenting with Sepsis (Multi).    Subjective   Patient is an 89-year-old female with PMH significant for paroxysmal atrial fibrillation (s/p PVI in 2016), CVA 03/2021, Hx multiple TIA, AVB s/p pacemaker, asthma, DLD, bilateral pseudophakia, MDD. Patient presented to [facility] for chief complaint of approximately 6 hours of fevers, chills, rigors, vomiting. She was found in ED to have low BP of 80/40, Pneumonia with possible colitis and admitted to ICU for sepsis and hypovolemia. She was given IV fluids, and Oxygen via nasal canula.  She did not require pressors. Once she was stable she was transferred to regular medical floor.  Patient states she feels she is ready to go home. Denies SOB or weakness.         Objective     Last Recorded Vitals  /63 (BP Location: Left arm, Patient Position: Lying)   Pulse 61   Temp 36 °C (96.8 °F) (Temporal)   Resp 20   Wt 63.3 kg (139 lb 8.8 oz)   SpO2 95%   Intake/Output last 3 Shifts:    Intake/Output Summary (Last 24 hours) at 10/8/2024 0733  Last data filed at 10/8/2024 0556  Gross per 24 hour   Intake 100 ml   Output 1300 ml   Net -1200 ml       Admission Weight  Weight: 58.5 kg (129 lb) (10/05/24 1522)    Daily Weight  10/08/24 : 63.3 kg (139 lb 8.8 oz)      Physical Exam:  General: Not in acute distress, alert  HEENT: PERRLA, head intact and normocephalic  Neck: Normal to inspection  Lungs: Clear to auscultation, work of breathing within normal limit  Cardiac: Regular rate and rhythm  Abdomen: Soft nontender, positive bowel sounds  : Exam deferred  Skin: Intact  Hematology: No petechia or excessive ecchymosis  Musculoskeletal: Without significant trauma. Able to walk independently in room  Neurological: Alert awake oriented, no focal deficit, cranial nerves grossly intact  Psych: No suicidal ideation or homicidal ideation    Relevant Results  Scheduled medications  atorvastatin, 10 mg, oral,  Nightly  cefTRIAXone, 2 g, intravenous, q24h  cholecalciferol, 2,000 Units, oral, Daily  magnesium oxide, 400 mg, oral, Daily  midodrine, 10 mg, oral, BID  pantoprazole, 40 mg, oral, Daily before breakfast  polyethylene glycol, 17 g, oral, Daily  potassium chloride, 20 mEq, oral, Once  rivaroxaban, 15 mg, oral, Daily  sertraline, 25 mg, oral, Daily  sotalol, 40 mg, oral, Daily      Continuous medications     PRN medications  PRN medications: acetaminophen, ondansetron, oxygen   Results for orders placed or performed during the hospital encounter of 10/05/24 (from the past 24 hour(s))   CBC and Auto Differential   Result Value Ref Range    WBC 6.7 4.4 - 11.3 x10*3/uL    nRBC 0.0 0.0 - 0.0 /100 WBCs    RBC 3.24 (L) 4.00 - 5.20 x10*6/uL    Hemoglobin 9.8 (L) 12.0 - 16.0 g/dL    Hematocrit 31.0 (L) 36.0 - 46.0 %    MCV 96 80 - 100 fL    MCH 30.2 26.0 - 34.0 pg    MCHC 31.6 (L) 32.0 - 36.0 g/dL    RDW 13.2 11.5 - 14.5 %    Platelets 89 (L) 150 - 450 x10*3/uL    Neutrophils % 67.0 40.0 - 80.0 %    Immature Granulocytes %, Automated 0.3 0.0 - 0.9 %    Lymphocytes % 21.7 13.0 - 44.0 %    Monocytes % 6.1 2.0 - 10.0 %    Eosinophils % 4.3 0.0 - 6.0 %    Basophils % 0.6 0.0 - 2.0 %    Neutrophils Absolute 4.51 1.60 - 5.50 x10*3/uL    Immature Granulocytes Absolute, Automated 0.02 0.00 - 0.50 x10*3/uL    Lymphocytes Absolute 1.46 0.80 - 3.00 x10*3/uL    Monocytes Absolute 0.41 0.05 - 0.80 x10*3/uL    Eosinophils Absolute 0.29 0.00 - 0.40 x10*3/uL    Basophils Absolute 0.04 0.00 - 0.10 x10*3/uL   Comprehensive metabolic panel   Result Value Ref Range    Glucose 88 74 - 99 mg/dL    Sodium 136 136 - 145 mmol/L    Potassium 3.8 3.5 - 5.3 mmol/L    Chloride 103 98 - 107 mmol/L    Bicarbonate 29 21 - 32 mmol/L    Anion Gap 8 (L) 10 - 20 mmol/L    Urea Nitrogen 15 6 - 23 mg/dL    Creatinine 0.84 0.50 - 1.05 mg/dL    eGFR 67 >60 mL/min/1.73m*2    Calcium 7.8 (L) 8.6 - 10.3 mg/dL    Albumin 2.9 (L) 3.4 - 5.0 g/dL    Alkaline Phosphatase  59 33 - 136 U/L    Total Protein 5.3 (L) 6.4 - 8.2 g/dL    AST 32 9 - 39 U/L    Bilirubin, Total 0.3 0.0 - 1.2 mg/dL    ALT 34 7 - 45 U/L   Magnesium   Result Value Ref Range    Magnesium 1.86 1.60 - 2.40 mg/dL      Lab Results   Component Value Date    BLOODCULT No growth at 2 days 10/05/2024    BLOODCULT No growth at 2 days 10/05/2024       US right upper quadrant    Result Date: 10/7/2024  Interpreted By:  Luis Sy, STUDY: US RIGHT UPPER QUADRANT;  10/7/2024 9:10 am   INDICATION: Signs/Symptoms:distended GB on CT.     COMPARISON: None.   ACCESSION NUMBER(S): [id number]   ORDERING CLINICIAN: DARVIN PASCUAL   TECHNIQUE: Multiple images of the right upper quadrant were obtained.   FINDINGS: LIVER: The liver measures 12.8 cm . No focal abnormality identified.   GALLBLADDER: The gallbladder is nondistended, and demonstrates no evidence of gallstones, wall thickening or surrounding fluid. The gallbladder wall thickness is 0.3 cm.   BILE DUCTS: No evidence of intra or extrahepatic biliary dilatation is identified; the common bile duct measures 0.5 cm.   PANCREAS: Mostly obscured by bowel gas.   RIGHT KIDNEY: The right kidney measures 10.2 cm in length. The renal cortical echogenicity and thickness are within normal limit.  No hydronephrosis or renal calculi are seen.       Mildly contracted gallbladder without findings of acute cholecystitis or cholelithiasis otherwise.   MACRO: None   Signed by: Luis Sy 10/7/2024 10:16 AM Dictation workstation:   [id number]    ECG 12 lead    Result Date: 10/7/2024  Electronic ventricular pacemaker When compared with ECG of 29-SEP-2022 14:05, Vent. rate has increased BY  25 BPM    ECG 12 lead    Result Date: 10/7/2024  Electronic ventricular pacemaker When compared with ECG of 05-OCT-2024 16:21, (unconfirmed) Vent. rate has decreased BY  12 BPM    CT angio chest for pulmonary embolism    Result Date: 10/5/2024  STUDY: CT Angiogram of the Chest, CT Abdomen and Pelvis  with IV Contrast; 10/5/2024 5:12 PM INDICATION: Evaluate for pulmonary embolism or other infectious process. Shortness of breath.  Hypoxia.  Abdominal pain with nausea and emesis. COMPARISON: XR chest same day 4:16 PM.  ACCESSION NUMBER(S): [id number], [id number] ORDERING CLINICIAN: SHANNEN CUI TECHNIQUE:  CTA of the chest was performed following rapid injection of intravenous contrast.  Images are reviewed and processed at a workstation according to the CT angiogram protocol with 3-D and/or MIP post processing imaging generated.  CT of the abdomen and pelvis was performed with intravenous contrast.  Omnipaque 350 75 mL was administered intravenously. Automated mA/kV exposure control was utilized and patient examination was performed in strict accordance with principles of ALARA. FINDINGS:  CTA CHEST: Pulmonary arteries are adequately opacified without acute or chronic filling defects.  The thoracic aorta is normal in course and caliber without dissection or aneurysm. The heart is normal in size without pericardial effusion.  Thoracic lymph nodes are not enlarged.  Left subclavian pacer device is demonstrated. There is no pleural effusion, pleural thickening, or pneumothorax. The airways are patent. There is airspace opacity left lower lobe concerning for pneumonia.. There is mild atelectasis of the left lower lobe and lingular lobe. There are small tree-in-bud nodular densities in the right middle lobe suggesting acute bronchiolitis.  There is atelectasis in the medial aspect of the right middle lobe. ABDOMEN:  LIVER: No hepatomegaly.  Smooth surface contour.  Is fatty liver morphology..  There is mild periportal edema.  BILE DUCTS: No intrahepatic or extrahepatic biliary ductal dilatation.  GALLBLADDER: The gallbladder is prominent.  STOMACH: Mild wall thickening of the stomach concerning for gastritis.  PANCREAS: No masses or ductal dilatation.  SPLEEN: No splenomegaly or focal splenic lesion.  Calcified  granuloma redemonstrated within the spleen.  ADRENAL GLANDS: No thickening or nodules.  KIDNEYS AND URETERS: Kidneys are normal in size and location.  No renal or ureteral calculi.  Small hypodensities in the left kidney suggest renal cysts.  PELVIS:  BLADDER: No abnormalities identified.  REPRODUCTIVE ORGANS: No abnormalities identified.  BOWEL: There is significant distal colonic and rectal stool.  There is bowel wall thickening in the colon in the descending and splenic flexure.  VESSELS: No abnormalities identified.  Abdominal aorta is normal in caliber.  PERITONEUM/RETROPERITONEUM/LYMPH NODES: No free fluid.  No pneumoperitoneum. No lymphadenopathy.  ABDOMINAL WALL: No abnormalities identified. SOFT TISSUES: No abnormalities identified.  BONES: No acute fracture or aggressive osseous lesion.  Thre is rightward convexity of the lumbar spine.  There is disc space narrowing and endplate degenerative changes of the lumbar spine.    Chest:  No pulmonary artery emboli. Acute bronchiolitis of right middle lobe.  Airspace opacity left lower lobe concerning for pneumonia. Multifocal mild atelectasis. Abdomen:  Prominent gallbladder.  Recommend ultrasound for further evaluation.  Mild periportal edema. Mild wall thickening of the stomach.  Please correlate for gastritis. Pelvis:  Wall thickening of the splenic flexure and descending colon suggesting colitis.  Significant distal colonic and rectal stool. Signed by Faheem Kumar, DO    CT abdomen pelvis w IV contrast    Result Date: 10/5/2024  STUDY: CT Angiogram of the Chest, CT Abdomen and Pelvis with IV Contrast; 10/5/2024 5:12 PM INDICATION: Evaluate for pulmonary embolism or other infectious process. Shortness of breath.  Hypoxia.  Abdominal pain with nausea and emesis. COMPARISON: XR chest same day 4:16 PM.  ACCESSION NUMBER(S): [id number], [id number] ORDERING CLINICIAN: SHANNEN CUI TECHNIQUE:  CTA of the chest was performed following rapid injection of  intravenous contrast.  Images are reviewed and processed at a workstation according to the CT angiogram protocol with 3-D and/or MIP post processing imaging generated.  CT of the abdomen and pelvis was performed with intravenous contrast.  Omnipaque 350 75 mL was administered intravenously. Automated mA/kV exposure control was utilized and patient examination was performed in strict accordance with principles of ALARA. FINDINGS:  CTA CHEST: Pulmonary arteries are adequately opacified without acute or chronic filling defects.  The thoracic aorta is normal in course and caliber without dissection or aneurysm. The heart is normal in size without pericardial effusion.  Thoracic lymph nodes are not enlarged.  Left subclavian pacer device is demonstrated. There is no pleural effusion, pleural thickening, or pneumothorax. The airways are patent. There is airspace opacity left lower lobe concerning for pneumonia.. There is mild atelectasis of the left lower lobe and lingular lobe. There are small tree-in-bud nodular densities in the right middle lobe suggesting acute bronchiolitis.  There is atelectasis in the medial aspect of the right middle lobe. ABDOMEN:  LIVER: No hepatomegaly.  Smooth surface contour.  Is fatty liver morphology..  There is mild periportal edema.  BILE DUCTS: No intrahepatic or extrahepatic biliary ductal dilatation.  GALLBLADDER: The gallbladder is prominent.  STOMACH: Mild wall thickening of the stomach concerning for gastritis.  PANCREAS: No masses or ductal dilatation.  SPLEEN: No splenomegaly or focal splenic lesion.  Calcified granuloma redemonstrated within the spleen.  ADRENAL GLANDS: No thickening or nodules.  KIDNEYS AND URETERS: Kidneys are normal in size and location.  No renal or ureteral calculi.  Small hypodensities in the left kidney suggest renal cysts.  PELVIS:  BLADDER: No abnormalities identified.  REPRODUCTIVE ORGANS: No abnormalities identified.  BOWEL: There is significant distal  colonic and rectal stool.  There is bowel wall thickening in the colon in the descending and splenic flexure.  VESSELS: No abnormalities identified.  Abdominal aorta is normal in caliber.  PERITONEUM/RETROPERITONEUM/LYMPH NODES: No free fluid.  No pneumoperitoneum. No lymphadenopathy.  ABDOMINAL WALL: No abnormalities identified. SOFT TISSUES: No abnormalities identified.  BONES: No acute fracture or aggressive osseous lesion.  Thre is rightward convexity of the lumbar spine.  There is disc space narrowing and endplate degenerative changes of the lumbar spine.    Chest:  No pulmonary artery emboli. Acute bronchiolitis of right middle lobe.  Airspace opacity left lower lobe concerning for pneumonia. Multifocal mild atelectasis. Abdomen:  Prominent gallbladder.  Recommend ultrasound for further evaluation.  Mild periportal edema. Mild wall thickening of the stomach.  Please correlate for gastritis. Pelvis:  Wall thickening of the splenic flexure and descending colon suggesting colitis.  Significant distal colonic and rectal stool. Signed by Faheem Kumar, DO    CT cervical spine wo IV contrast    Result Date: 10/5/2024  Interpreted By:  Demetrius Barlow, STUDY: CT of the cervical spine  without contrast dated  10/5/2024.   INDICATION: Pain; trauma.   COMPARISON: None.   ACCESSION NUMBER(S): [id number]   ORDERING CLINICIAN: SHANNEN CUI   TECHNIQUE: Axial CT of the cervical spine was performed  without intravenous contrast.   Sagittal and coronal two-dimensional reformats were obtained.   FINDINGS: OSSEOUS STRUCTURES:   The bones are demineralized. The cervical spine is seen to  C7/T1. Vertebral body height and alignment are maintained.  The dens and lateral masses are intact.  No fracture or dislocation is evident. There is mild-to-moderate multilevel discogenic and facet degenerative change of the visualized spine. Mild neural foraminal narrowing is seen at multiple levels in the midcervical spine. Posterior disc  osteophyte complexes cause impression of the ventral thecal sac at multiple levels in the mid cervical spine without edgar central canal narrowing evident as seen on noncontrast CT.   SOFT TISSUES:   No prevertebral soft tissue swelling is evident.  Mucosal thickening is seen in the bases of the maxillary sinuses. Calcified atheromatous disease is seen in the visualized arterial tree. Scarring and/or atelectasis is seen at the lung apices. There is partial visualization of pulse generator leads in the left subclavian region..       Degenerative change of the cervical spine without osseous injury evident.   Signed by: Demetrius Barlow 10/5/2024 6:17 PM Dictation workstation:   [id number]    CT head wo IV contrast    Result Date: 10/5/2024  Interpreted By:  Demetrius Barlow, STUDY: CT of the brain without contrast dated  10/5/2024.   INDICATION: Signs/Symptoms:nausea/vomiting, dizziness   COMPARISON: CT dated 03/29/2011   ACCESSION NUMBER(S): [id number]   ORDERING CLINICIAN: SHANNEN CUI   TECHNIQUE: Axial non-contrast CT of the brain was performed. Sagittal and coronal 2D reformats were obtained.   FINDINGS: BRAIN PARENCHYMA:   No acute intracranial hemorrhage or infarction is evident.  There are deep and periventricular white matter hypodensities compatible with chronic ischemic demyelination.   VENTRICLES AND EXTRA-AXIAL SPACES:   No hydrocephalus or midline shift is evident.  There is mild cerebral volume loss with ex vacuo dilation of the ventricles.   INTRACRANIAL VESSELS:   Calcified atheromatous disease is seen of the visualized carotid and vertebrobasilar arterial trees.   SINUSES AND MASTOIDS:   Mucosal thickening is seen in the bases of the maxillary sinuses.   OSSEOUS STRUCTURES:   No osseous injury is evident.   SOFT TISSUES:   Visualized associated soft tissues are grossly unremarkable.       No acute intracranial hemorrhage or large territory infarction is evident.   Signed by: Demetrius Barlow 10/5/2024  6:15 PM Dictation workstation:   [id number]    XR chest 1 view    Result Date: 10/5/2024  STUDY: Chest Radiograph;  10/05/2024 INDICATION: Hypoxia. COMPARISON: XR chest 03/29/2021. ACCESSION NUMBER(S): [id number] ORDERING CLINICIAN: SHANNEN CUI TECHNIQUE:  Frontal chest was obtained at 16:36 hours. FINDINGS: CARDIOMEDIASTINAL SILHOUETTE: Cardiomediastinal silhouette is normal in size and configuration. Left subclavian pacer device is demonstrated.  LUNGS: Lungs are clear.  There is no pneumothorax or pleural effusion.  ABDOMEN: No remarkable upper abdominal findings.  BONES: No acute osseous changes.    No acute cardiopulmonary disease. Signed by Faheem Kumar, DO          Assessment/Plan   Namita Louis is a 89 y.o. female on day 3 of admission presenting with Sepsis (Multi).  Principal Problem:    Sepsis (Multi)  Active Problems:    Pneumonia of left lower lobe due to infectious organism    Pneumonia due to infectious organism, unspecified laterality, unspecified part of lung    Sepsis due to Community acquired LLL pneumonia and viral gastroenteritis  Covid, MRSA and Flu PCR's negative  Blood cultures negative X 2  Currently on IV Rocephin.      Paroxysmal Atrial Fibrillation s/p PVI 2016, AVB s/p pacemaker DLD  Last echo: 03/21: LVEF 55-60%, impaired relaxation pattern of LV DF, mild AVR, RVSP WNL, small PFO appreciated   Continue home Xarelto and Sotalol  SCD's    Hyperlipidemia  Continue Lipitor 10 mg.    Recurrent major depression in remission  Due to interaction between home Citalopram 10 mg and Sotalol she was changed to Sertraline 25 mg.    Code status: DNR and no intubation.     Plan discussed with patient at bedside      Minnie Arrington MD

## 2024-10-08 NOTE — PROGRESS NOTES
Occupational Therapy                 Therapy Communication Note    Patient Name: Namita Louis  MRN: [id number]  Department:   Room: 3131/3131-A  Today's Date: 10/8/2024     Discipline: Occupational Therapy    Comment: Received orders for OT eval, and completed chart review. Attempted OT eval this am, and pt had already been discharged home.

## 2024-10-08 NOTE — CARE PLAN
The patient's goals for the shift include    Problem: Discharge Planning  Goal: Discharge to home or other facility with appropriate resources  Outcome: Progressing     Problem: Fall/Injury  Goal: Verbalize understanding of risk factor reduction measures to prevent injury from fall in the home  Outcome: Progressing  Goal: Not fall by end of shift  Outcome: Progressing  Goal: Be free from injury by end of the shift  Outcome: Progressing  Goal: Verbalize understanding of personal risk factors for fall in the hospital  Outcome: Progressing  Goal: Use assistive devices by end of the shift  Outcome: Progressing  Goal: Pace activities to prevent fatigue by end of the shift  Outcome: Progressing     Problem: Respiratory  Goal: Verbalize decreased shortness of breath this shift  Outcome: Progressing  Goal: Wean oxygen to maintain O2 saturation per order/standard this shift  Outcome: Progressing  Goal: Clear secretions with interventions this shift  Outcome: Progressing  Goal: Minimize anxiety/maximize coping throughout shift  Outcome: Progressing  Goal: Minimal/no exertional discomfort or dyspnea this shift  Outcome: Progressing  Goal: No signs of respiratory distress (eg. Use of accessory muscles. Peds grunting)  Outcome: Progressing  Goal: Patent airway maintained this shift  Outcome: Progressing  Goal: Tolerate mechanical ventilation evidenced by VS/agitation level this shift  Outcome: Progressing  Goal: Tolerate pulmonary toileting this shift  Outcome: Progressing  Goal: Increase self care and/or family involvement in next 24 hours  Outcome: Progressing     Problem: Fluid/Electrolyte/Nutrition  Goal: Normal electrolyte levels  Outcome: Progressing     Problem: Skin  Goal: Decreased wound size/increased tissue granulation at next dressing change  Outcome: Progressing  Goal: Participates in plan/prevention/treatment measures  Outcome: Progressing  Goal: Prevent/manage excess moisture  Outcome: Progressing  Goal:  Prevent/minimize sheer/friction injuries  Outcome: Progressing  Goal: Promote/optimize nutrition  Outcome: Progressing  Goal: Promote skin healing  Outcome: Progressing       The clinical goals for the shift include pt will remain hemodynamically stable this shift

## 2024-10-08 NOTE — CARE PLAN
The patient's goals for the shift include      The clinical goals for the shift include patient will remain hemodynamically stable this shift    Problem: Fall/Injury  Goal: Verbalize understanding of risk factor reduction measures to prevent injury from fall in the home  10/8/2024 0446 by Shannon Paulino RN  Outcome: Progressing  10/8/2024 0445 by Shannon Paulino RN  Outcome: Progressing  Goal: Not fall by end of shift  Outcome: Progressing  Goal: Be free from injury by end of the shift  Outcome: Progressing  Goal: Verbalize understanding of personal risk factors for fall in the hospital  Outcome: Progressing  Goal: Use assistive devices by end of the shift  Outcome: Progressing     Problem: Respiratory  Goal: Verbalize decreased shortness of breath this shift  10/8/2024 0446 by Shannon Paulino RN  Outcome: Progressing  10/8/2024 0445 by Shannon Paulino RN  Outcome: Progressing  Goal: Wean oxygen to maintain O2 saturation per order/standard this shift  10/8/2024 0446 by Shannon Paulino RN  Outcome: Progressing  10/8/2024 0445 by Shannon Paulino RN  Outcome: Progressing  Goal: Clear secretions with interventions this shift  Outcome: Progressing  Goal: Minimize anxiety/maximize coping throughout shift  Outcome: Progressing  Goal: Minimal/no exertional discomfort or dyspnea this shift  Outcome: Progressing  Goal: No signs of respiratory distress (eg. Use of accessory muscles. Peds grunting)  Outcome: Progressing  Goal: Patent airway maintained this shift  Outcome: Progressing  Goal: Tolerate mechanical ventilation evidenced by VS/agitation level this shift  Outcome: Progressing  Goal: Tolerate pulmonary toileting this shift  Outcome: Progressing     Problem: Fluid/Electrolyte/Nutrition  Goal: Normal electrolyte levels  10/8/2024 0446 by Shannon Paulino RN  Outcome: Progressing  10/8/2024 0445 by Shannon Paulino RN  Outcome: Progressing     Problem: Fall/Injury  Goal: Verbalize understanding of risk factor reduction measures to prevent  injury from fall in the home  10/8/2024 0446 by Shannon Paulino RN  Outcome: Progressing  10/8/2024 0445 by Shannon Paulino RN  Outcome: Progressing  Goal: Not fall by end of shift  Outcome: Progressing  Goal: Be free from injury by end of the shift  Outcome: Progressing  Goal: Verbalize understanding of personal risk factors for fall in the hospital  Outcome: Progressing  Goal: Use assistive devices by end of the shift  Outcome: Progressing     Problem: Respiratory  Goal: Verbalize decreased shortness of breath this shift  10/8/2024 0446 by Shannon Paulino RN  Outcome: Progressing  10/8/2024 0445 by Shannon Paulino RN  Outcome: Progressing  Goal: Wean oxygen to maintain O2 saturation per order/standard this shift  10/8/2024 0446 by Shannon Paulino RN  Outcome: Progressing  10/8/2024 0445 by Shannon Paulino RN  Outcome: Progressing  Goal: Clear secretions with interventions this shift  Outcome: Progressing  Goal: Minimize anxiety/maximize coping throughout shift  Outcome: Progressing  Goal: Minimal/no exertional discomfort or dyspnea this shift  Outcome: Progressing  Goal: No signs of respiratory distress (eg. Use of accessory muscles. Peds grunting)  Outcome: Progressing  Goal: Patent airway maintained this shift  Outcome: Progressing  Goal: Tolerate mechanical ventilation evidenced by VS/agitation level this shift  Outcome: Progressing  Goal: Tolerate pulmonary toileting this shift  Outcome: Progressing     Problem: Fluid/Electrolyte/Nutrition  Goal: Normal electrolyte levels  10/8/2024 0446 by Shannon Paulino RN  Outcome: Progressing  10/8/2024 0445 by Shannon Paulino RN  Outcome: Progressing

## 2024-10-08 NOTE — DISCHARGE SUMMARY
Discharge Diagnosis  Sepsis (Multi)    Issues Requiring Follow-Up  Pneumonia  Viral Gastroenteritis  Vaccine update    Discharge Meds     Medication List      START taking these medications     amoxicillin-pot clavulanate 875-125 mg tablet; Commonly known as:   Augmentin; Take 1 tablet by mouth 2 times a day for 10 days.   sertraline 25 mg tablet; Commonly known as: Zoloft; Take 1 tablet (25   mg) by mouth once daily.     CHANGE how you take these medications     Xarelto 15 mg tablet; Generic drug: rivaroxaban; Take 1 tablet (15 mg)   by mouth once daily.; What changed: additional instructions     CONTINUE taking these medications     atorvastatin 10 mg tablet; Commonly known as: Lipitor   cholecalciferol 50 MCG (2000 UT) tablet; Commonly known as: Vitamin D-3   sotalol 80 mg tablet; Commonly known as: Betapace; Take 0.5 tablets (40   mg) by mouth once daily.       Test Results Pending At Discharge  Pending Labs       Order Current Status    Basic Metabolic Panel Collected (10/08/24 0745)    CBC Collected (10/08/24 0745)    Magnesium Collected (10/08/24 0745)    Procalcitonin In process    Blood Culture Preliminary result    Blood Culture Preliminary result            Hospital Course  Patient is an 89-year-old female with PMH significant for paroxysmal atrial fibrillation (s/p PVI in 2016), CVA 03/2021, Hx multiple TIA, AVB s/p pacemaker, asthma, DLD, bilateral pseudophakia, MDD. Patient presented to [facility] for chief complaint of approximately 6 hours of fevers, chills, rigors, vomiting. She was found in ED to have low BP of 80/40, Pneumonia with possible colitis and admitted to ICU for sepsis and hypovolemia. She was given IV fluids, and Oxygen via nasal canula.  She did not require pressors. Once she was stable she was transferred to regular medical floor.  On the regular medical floor she was present for less than 24 hours and reported feeling fine, denied abdominal pain, SOB or weakness.    Sepsis due to Community  acquired LLL pneumonia and viral gastroenteritis  Covid, MRSA and Flu PCR's negative  Blood cultures negative X 2  IV Rocephin during hospital stay and changed to oral Augmentin at discharge.        Paroxysmal Atrial Fibrillation s/p PVI 2016, AVB s/p pacemaker DLD  Last echo: 03/21: LVEF 55-60%, impaired relaxation pattern of LV DF, mild AVR, RVSP WNL, small PFO appreciated   Continued home Xarelto and Sotalol  SCD's     Hyperlipidemia  Continued Lipitor 10 mg.     Recurrent major depression in remission  Due to interaction between home Citalopram 10 mg and Sotalol she was changed to Sertraline 25 mg.     Code status: DNR and no intubation.      Pertinent Physical Exam At Time of Discharge  Physical Exam  General: Not in acute distress, alert  HEENT: PERRLA, head intact and normocephalic  Neck: Normal to inspection  Lungs: Clear to auscultation, work of breathing within normal limit  Cardiac: Regular rate and rhythm  Abdomen: Soft nontender, positive bowel sounds  : Exam deferred  Skin: Intact  Hematology: No petechia or excessive ecchymosis  Musculoskeletal: Without significant trauma. Able to walk independently in room  Neurological: Alert awake oriented, no focal deficit, cranial nerves grossly intact  Psych: No suicidal ideation or homicidal ideation         Outpatient Follow-Up  Future Appointments   Date Time Provider Department Center   2/14/2025 10:20 AM [facility] [id number] [place]   2/14/2025 10:40 AM Glen Henriquez MD [id number] [place]     Follow up with Dr Arrington in office in one week after discharge.    Minnie Arrington MD

## 2024-10-08 NOTE — PROGRESS NOTES
10/08/24 0932   Discharge Planning   Living Arrangements Alone   Support Systems Family members   Assistance Needed some   Type of Residence Private residence   Home or Post Acute Services In home services   Type of Home Care Services Home PT;Home OT   Expected Discharge Disposition Home H   Does the patient need discharge transport arranged? No   Financial Resource Strain   How hard is it for you to pay for the very basics like food, housing, medical care, and heating? Not hard   Housing Stability   In the last 12 months, was there a time when you were not able to pay the mortgage or rent on time? N   At any time in the past 12 months, were you homeless or living in a shelter (including now)? N   Transportation Needs   In the past 12 months, has lack of transportation kept you from medical appointments or from getting medications? no   In the past 12 months, has lack of transportation kept you from meetings, work, or from getting things needed for daily living? No   Patient Choice   Provider Choice list and CMS website (https://medicare.gov/care-compare#search) for post-acute Quality and Resource Measure Data were provided and reviewed with: Patient;Family     Met with patient at the bedside, confirmed her demographics, insurance and pcp. She lives alone but her daughter [facility]. She uses a cane at times, but is recommended to use a wheeled walker by therapy. She is able to take care of her personal care needs, driving and running her own errands. She denies any financial concerns, able to purchase her medications, takes as oredered, and received education. She plans to discharge home with home care for pt.ot. Patient's preference is [facility], Dr. Minnie Christine to follow for home care orders.     1310: Message sent to Dr. Arrington to order home for pt.     1315: message sent to NP to enter home care order

## 2024-10-08 NOTE — PROGRESS NOTES
Physical Therapy    Physical Therapy    Physical Therapy Treatment    Patient Name: Namita Louis  MRN: [id number]  Today's Date: 10/8/2024  Time Calculation  Start Time: 0808  Stop Time: 0834  Time Calculation (min): 26 min     3131/3131-A       10/08/24 0808   PT  Visit   PT Received On 10/08/24   Response to Previous Treatment Patient with no complaints from previous session.   General   Reason for Referral Pt adm to ICU with pneumonia, colitis, nause and vomiting, hypotension, likely viral gastroenteritis, sepsis.   Referred By Minnie Arrington MD   Prior to Session Communication Bedside nurse   Patient Position Received Bed, 3 rail up;Alarm on   General Comment Pt is agreeable to PT assessment.   Precautions   Medical Precautions Fall precautions   Pain Assessment   Pain Assessment 0-10   0-10 (Numeric) Pain Score 0 - No pain   Effect of Pain on Daily Activities .   Cognition   Overall Cognitive Status WFL   Orientation Level Oriented X4   Bed Mobility   Bed Mobility Yes   Bed Mobility 1   Bed Mobility 1 Supine to sitting   Level of Assistance 1 Close supervision   Bed Mobility Comments 1 HOB elevated   Ambulation/Gait Training   Ambulation/Gait Training Performed Yes   Ambulation/Gait Training 1   Surface 1 Level tile   Device 1 Rolling walker   Gait Support Devices Gait belt   Assistance 1 Contact guard;Close supervision   Quality of Gait 1 NBOS;Decreased step length   Comments/Distance (ft) 1 20' no AD, CGA, pt is very cautious, reaching for objects in room, mildly unsteady. 20'x2, 40'x2 with WW increased steadiness and safety awareness. CGA>SBA for safety concerns. Encouraged pt to use AD for increaed safety, nurse made aware,   Transfers   Transfer Yes   Transfer 1   Transfer From 1 Bed to   Transfer to 1 Sit   Technique 1 Sit to stand   Transfer Device 1 Walker;Gait belt   Transfer Level of Assistance 1 Contact guard   Transfers 2   Transfer From 2 Stand to   Transfer to 2 Chair with arms   Technique 2 Sit  to stand;Stand to sit   Transfer Device 2 Walker;Gait belt   Transfer Level of Assistance 2 Contact guard;Close supervision   Trials/Comments 2 Multiple STS from chair level including x5 consecutive for increased strength and stability with functional transfers. CGA progressing to SBA with min cues for safe UE placement and sequencing, good follow through   Activity Tolerance   Endurance Tolerates 10 - 20 min exercise with multiple rests   PT Assessment   PT Assessment Results Decreased strength;Decreased endurance;Impaired balance;Decreased mobility   Rehab Prognosis Good   End of Session Communication Bedside nurse   Assessment Comment Pt put forth good effort. Mildly fatigued after multiple ambulation trials. Seated rest breaks as needed. Encouraged pt to use WW for increased safety and stability. Nurse made aware.   End of Session Patient Position Up in chair;Alarm on     Outcome Measures:  [facility] Basic Mobility  Turning from your back to your side while in a flat bed without using bedrails: A little  Moving from lying on your back to sitting on the side of a flat bed without using bedrails: A little  Moving to and from bed to chair (including a wheelchair): A little  Standing up from a chair using your arms (e.g. wheelchair or bedside chair): A little  To walk in hospital room: A little  Climbing 3-5 steps with railing: A lot  Basic Mobility - Total Score: 17                             EDUCATION:  Outpatient Education  Individual(s) Educated: Patient  Education Provided: Fall Risk  Education Documentation  No documentation found.  Education Comments  No comments found.        GOALS:  Encounter Problems       Encounter Problems (Active)       PT Problem       Bed mobility (Progressing)       Start:  10/07/24    Expected End:  10/21/24       Pt will perform supine<>sit with HOB flat, CLARA.           Transfers (Progressing)       Start:  10/07/24    Expected End:  10/21/24       Pt will perform all transfers with  LRAD, CLARA.            Gait (Progressing)       Start:  10/07/24    Expected End:  10/21/24       Pt will amb 150' with LRAD, CLARA with reciprocal gait, upright posture and improved activity tolerance as demonstrated by vitals.           Balance (Progressing)       Start:  10/07/24    Expected End:  10/21/24       Pt will tolerate standing x2 min without UE support, SBAx1.             Pain - Adult

## 2024-10-09 LAB
ATRIAL RATE: 75 BPM
ATRIAL RATE: 87 BPM
P AXIS: 65 DEGREES
P AXIS: 71 DEGREES
P OFFSET: 121 MS
P OFFSET: 130 MS
P ONSET: 78 MS
P ONSET: 83 MS
PR INTERVAL: 230 MS
PR INTERVAL: 270 MS
Q ONSET: 198 MS
Q ONSET: 213 MS
QRS COUNT: 12 BEATS
QRS COUNT: 15 BEATS
QRS DURATION: 130 MS
QRS DURATION: 156 MS
QT INTERVAL: 424 MS
QT INTERVAL: 438 MS
QTC CALCULATION(BAZETT): 473 MS
QTC CALCULATION(BAZETT): 527 MS
QTC FREDERICIA: 456 MS
QTC FREDERICIA: 495 MS
R AXIS: -13 DEGREES
R AXIS: -6 DEGREES
T AXIS: 78 DEGREES
T AXIS: 80 DEGREES
T OFFSET: 417 MS
T OFFSET: 425 MS
VENTRICULAR RATE: 75 BPM
VENTRICULAR RATE: 87 BPM

## 2024-10-10 LAB
BACTERIA BLD CULT: NORMAL
BACTERIA BLD CULT: NORMAL

## 2024-11-19 DIAGNOSIS — Z95.0 PACEMAKER: Primary | ICD-10-CM

## 2024-12-03 ENCOUNTER — HOSPITAL ENCOUNTER (OUTPATIENT)
Dept: CARDIOLOGY | Facility: HOSPITAL | Age: 89
Discharge: HOME | End: 2024-12-03
Payer: MEDICARE

## 2024-12-03 DIAGNOSIS — Z95.0 CARDIAC PACEMAKER IN SITU: ICD-10-CM

## 2024-12-03 DIAGNOSIS — I48.0 PAROXYSMAL ATRIAL FIBRILLATION (MULTI): ICD-10-CM

## 2024-12-03 PROCEDURE — 93296 REM INTERROG EVL PM/IDS: CPT

## 2024-12-03 PROCEDURE — 93294 REM INTERROG EVL PM/LDLS PM: CPT | Performed by: INTERNAL MEDICINE

## 2025-01-08 DIAGNOSIS — R00.1 BRADYCARDIA ON ECG: ICD-10-CM

## 2025-01-08 DIAGNOSIS — Z95.0 PACEMAKER: Primary | ICD-10-CM

## 2025-01-10 ENCOUNTER — HOSPITAL ENCOUNTER (OUTPATIENT)
Dept: CARDIOLOGY | Facility: CLINIC | Age: 89
Discharge: HOME | End: 2025-01-10
Payer: MEDICARE

## 2025-01-10 DIAGNOSIS — Z95.0 PACEMAKER: ICD-10-CM

## 2025-01-10 PROCEDURE — 93280 PM DEVICE PROGR EVAL DUAL: CPT | Performed by: NURSE PRACTITIONER

## 2025-01-10 PROCEDURE — 93280 PM DEVICE PROGR EVAL DUAL: CPT

## 2025-01-22 PROBLEM — E78.5 HYPERLIPIDEMIA: Status: ACTIVE | Noted: 2022-04-12

## 2025-01-22 PROBLEM — I69.359 HEMIPARESIS AS LATE EFFECT OF CEREBROVASCULAR ACCIDENT (CVA) (MULTI): Status: ACTIVE | Noted: 2025-01-22

## 2025-01-22 PROBLEM — Z95.0 PRESENCE OF CARDIAC PACEMAKER: Status: ACTIVE | Noted: 2023-09-20

## 2025-01-22 PROBLEM — Z86.73 H/O: CVA (CEREBROVASCULAR ACCIDENT): Status: ACTIVE | Noted: 2025-01-22

## 2025-02-03 ENCOUNTER — HOSPITAL ENCOUNTER (OUTPATIENT)
Dept: CARDIOLOGY | Facility: HOSPITAL | Age: 89
Discharge: HOME | End: 2025-02-03
Payer: MEDICARE

## 2025-02-03 DIAGNOSIS — Z95.0 CARDIAC PACEMAKER IN SITU: ICD-10-CM

## 2025-02-03 DIAGNOSIS — I48.91 ATRIAL FIBRILLATION, UNSPECIFIED TYPE (MULTI): ICD-10-CM

## 2025-02-10 ENCOUNTER — APPOINTMENT (OUTPATIENT)
Dept: CARDIOLOGY | Facility: CLINIC | Age: 89
End: 2025-02-10
Payer: MEDICARE

## 2025-02-13 ENCOUNTER — APPOINTMENT (OUTPATIENT)
Dept: CARDIOLOGY | Facility: CLINIC | Age: 89
End: 2025-02-13
Payer: MEDICARE

## 2025-02-14 ENCOUNTER — APPOINTMENT (OUTPATIENT)
Dept: CARDIOLOGY | Facility: CLINIC | Age: 89
End: 2025-02-14
Payer: MEDICARE

## 2025-02-25 DIAGNOSIS — I48.0 PAROXYSMAL ATRIAL FIBRILLATION (MULTI): ICD-10-CM

## 2025-02-27 RX ORDER — RIVAROXABAN 15 MG/1
15 TABLET, FILM COATED ORAL DAILY
Qty: 90 TABLET | Refills: 1 | Status: SHIPPED | OUTPATIENT
Start: 2025-02-27

## 2025-05-06 ENCOUNTER — APPOINTMENT (OUTPATIENT)
Dept: CARDIOLOGY | Facility: CLINIC | Age: 89
End: 2025-05-06
Payer: MEDICARE

## 2025-05-06 VITALS
DIASTOLIC BLOOD PRESSURE: 74 MMHG | WEIGHT: 127 LBS | OXYGEN SATURATION: 87 % | BODY MASS INDEX: 22.5 KG/M2 | SYSTOLIC BLOOD PRESSURE: 126 MMHG | HEART RATE: 87 BPM

## 2025-05-06 DIAGNOSIS — Z95.0 PRESENCE OF CARDIAC PACEMAKER: Primary | ICD-10-CM

## 2025-05-06 DIAGNOSIS — I48.91 ATRIAL FIBRILLATION, UNSPECIFIED TYPE (MULTI): ICD-10-CM

## 2025-05-06 DIAGNOSIS — Z79.899 ENCOUNTER FOR MONITORING SOTALOL THERAPY: ICD-10-CM

## 2025-05-06 DIAGNOSIS — E78.5 HYPERLIPIDEMIA, UNSPECIFIED HYPERLIPIDEMIA TYPE: ICD-10-CM

## 2025-05-06 DIAGNOSIS — Z51.81 ENCOUNTER FOR MONITORING SOTALOL THERAPY: ICD-10-CM

## 2025-05-06 PROBLEM — I48.0 PAROXYSMAL ATRIAL FIBRILLATION (MULTI): Status: ACTIVE | Noted: 2025-05-06

## 2025-05-06 PROCEDURE — 93005 ELECTROCARDIOGRAM TRACING: CPT | Performed by: STUDENT IN AN ORGANIZED HEALTH CARE EDUCATION/TRAINING PROGRAM

## 2025-05-06 PROCEDURE — 99202 OFFICE O/P NEW SF 15 MIN: CPT

## 2025-05-06 PROCEDURE — 1159F MED LIST DOCD IN RCRD: CPT | Performed by: STUDENT IN AN ORGANIZED HEALTH CARE EDUCATION/TRAINING PROGRAM

## 2025-05-06 PROCEDURE — 99214 OFFICE O/P EST MOD 30 MIN: CPT | Performed by: STUDENT IN AN ORGANIZED HEALTH CARE EDUCATION/TRAINING PROGRAM

## 2025-05-06 PROCEDURE — G2211 COMPLEX E/M VISIT ADD ON: HCPCS | Performed by: STUDENT IN AN ORGANIZED HEALTH CARE EDUCATION/TRAINING PROGRAM

## 2025-05-06 NOTE — PROGRESS NOTES
Cardiac Electrophysiology Office Visit     Referred by Dr. Henriquez, Glen EGAN MD for   Chief Complaint   Patient presents with    New Patient Visit    Atrial Fibrillation     HPI:  Namita Louis is a 89 y.o. year old female patient with h/o CVA (2021), pAF s/p PVI (2016), SSS s/p dcPPM, s/p ILR, HLD presenting today to establish care    Objective  Current Outpatient Medications   Medication Instructions    atorvastatin (LIPITOR) 10 mg, Daily    cholecalciferol (VITAMIN D-3) 2,000 Units, Daily    sertraline (ZOLOFT) 25 mg, oral, Daily    sotalol (BETAPACE) 40 mg, oral, Daily    Xarelto 15 mg, oral, Daily         Visit Vitals  /74   Pulse 87   Wt 57.6 kg (127 lb)   SpO2 (!) 87%   BMI 22.50 kg/m²   Smoking Status Never   BSA 1.6 m²      Physical Exam  Vitals reviewed.   Constitutional:       Appearance: Normal appearance.   HENT:      Head: Normocephalic.   Cardiovascular:      Rate and Rhythm: Normal rate and regular rhythm.   Pulmonary:      Effort: Pulmonary effort is normal. No respiratory distress.      Breath sounds: No wheezing.   Skin:     General: Skin is warm and dry.      Capillary Refill: Capillary refill takes less than 2 seconds.   Neurological:      Mental Status: She is alert.   Psychiatric:         Mood and Affect: Mood normal.           My Interpretation of Reviewed Study(s):  Results  DIAGNOSTIC  Echocardiogram: EF 55-60%, normal biatrial size, small PFO, mild AI, no pericardial effusion (03/2021)  EKG: Normal (04/22/2025)     [id number] Score  Age >= 75: 2   Sex Female: 1   CHF History No: 0   HTN No: 0   Stroke/TIA/Thromboembolism Yes: 2   Vascular Dz: CAD/PAD/Aortic Plaque No: 0   DM No: 0   Total Score 5     Assessment & Plan  Paroxysmal atrial fibrillation  AF Dx History: 2016; h/o Cardioversion: No; AAD Use: Sotalol 40mg Daily; Anticoagulation use: Xarelto 15mg Daily (current); h/o Ablation: Yes; [id number] Score: 5  Atrial fibrillation post-ablation, managed with sotalol  and Xarelto. Occasional AFib episodes, well-controlled. QT interval normal. Recent EKG and device check unremarkable.  - Continue Xarelto 15 mg daily.  - Continue sotalol 40 mg daily.  - Order blood work in six months to monitor kidney function and other parameters.    SSS s/p dcPPM (MDT 2021)   Pacemaker implanted in 2021 for bradycardia. Recent check shows 8.5 years battery life, no functional issues.  Patient has a [employer]onic Dual chamber pacemaker.  Anticipated battery longevity 8.4yrs (as of 4/20/25).    RA pacing 33.2%, RV pacing 99.6%.   Arrhythmias noted on interrogation: AF burden <0.1%  Lead parameters stable with steady impedance and thresholds noted: Stable  - c/t follow with device clinic as scheduled    Sotalol (Betapace) - High risk medications   Labs:  Recent Labs     10/08/24  0745 10/07/24  0757 10/06/24  0325 10/05/24  1554 09/29/22  0447   CREATININE 0.68 0.84 1.02 0.98 1.01   BUN 12 15 16 18 31*   EGFR 84 67 53* 56*  --    K 4.0 3.8 4.2 4.1 4.0   MG 1.85 1.86 2.46* 1.58* 2.00     Follow up:  Serum creatinine, potassium, magnesium all normal before.  Patient has a blood work done with her primary care physician last week will wait for results.  October 20  ECG (Every 3-6 months): ECG Dated 5/6/25; QT/Qtc Stable  - Continue with Sotalol 40mg once a day  - Repeat BMP, mag prior to next visit     Return to Clinic: Patient should return to the EP Clinic in 6 months    Victoriano Mtichell MD [facility]  Cardiac Electrophysiology  [email or web address]    **Disclaimer: This note was dictated by speech recognition, and every effort has been made to prevent any error in transcription, however minor errors may be present**    This medical note was created with the assistance of artificial intelligence (AI) for documentation purposes. The content has been reviewed and confirmed by the healthcare provider for accuracy and completeness. Patient consented to the use of audio recording and use of AI during their  visit.

## 2025-05-09 LAB
ATRIAL RATE: 87 BPM
PR INTERVAL: 228 MS
Q ONSET: 214 MS
QRS COUNT: 15 BEATS
QRS DURATION: 126 MS
QT INTERVAL: 382 MS
QTC CALCULATION(BAZETT): 459 MS
QTC FREDERICIA: 432 MS
R AXIS: 39 DEGREES
T AXIS: 240 DEGREES
T OFFSET: 405 MS
VENTRICULAR RATE: 87 BPM

## 2025-07-21 ENCOUNTER — HOSPITAL ENCOUNTER (OUTPATIENT)
Dept: CARDIOLOGY | Facility: CLINIC | Age: 89
Discharge: HOME | End: 2025-07-21
Payer: MEDICARE

## 2025-07-21 DIAGNOSIS — Z95.0 CARDIAC PACEMAKER IN SITU: ICD-10-CM

## 2025-07-21 DIAGNOSIS — I48.91 ATRIAL FIBRILLATION, UNSPECIFIED TYPE (MULTI): ICD-10-CM

## 2025-07-21 PROCEDURE — 93296 REM INTERROG EVL PM/IDS: CPT

## 2025-11-06 ENCOUNTER — APPOINTMENT (OUTPATIENT)
Dept: CARDIOLOGY | Facility: CLINIC | Age: 89
End: 2025-11-06
Payer: MEDICARE